# Patient Record
Sex: FEMALE | Race: WHITE | Employment: UNEMPLOYED | URBAN - METROPOLITAN AREA
[De-identification: names, ages, dates, MRNs, and addresses within clinical notes are randomized per-mention and may not be internally consistent; named-entity substitution may affect disease eponyms.]

---

## 2017-11-08 ENCOUNTER — OFFICE VISIT (OUTPATIENT)
Dept: GASTROENTEROLOGY | Facility: CLINIC | Age: 32
End: 2017-11-08

## 2017-11-08 ENCOUNTER — LAB ENCOUNTER (OUTPATIENT)
Dept: LAB | Facility: HOSPITAL | Age: 32
End: 2017-11-08
Attending: INTERNAL MEDICINE
Payer: COMMERCIAL

## 2017-11-08 VITALS
WEIGHT: 144.63 LBS | SYSTOLIC BLOOD PRESSURE: 109 MMHG | HEART RATE: 60 BPM | HEIGHT: 63.5 IN | BODY MASS INDEX: 25.31 KG/M2 | DIASTOLIC BLOOD PRESSURE: 78 MMHG

## 2017-11-08 DIAGNOSIS — K59.00 CONSTIPATION, UNSPECIFIED CONSTIPATION TYPE: ICD-10-CM

## 2017-11-08 DIAGNOSIS — R11.2 NON-INTRACTABLE VOMITING WITH NAUSEA, UNSPECIFIED VOMITING TYPE: Primary | ICD-10-CM

## 2017-11-08 DIAGNOSIS — R11.2 NON-INTRACTABLE VOMITING WITH NAUSEA, UNSPECIFIED VOMITING TYPE: ICD-10-CM

## 2017-11-08 PROCEDURE — 99204 OFFICE O/P NEW MOD 45 MIN: CPT | Performed by: INTERNAL MEDICINE

## 2017-11-08 PROCEDURE — 99212 OFFICE O/P EST SF 10 MIN: CPT | Performed by: INTERNAL MEDICINE

## 2017-11-08 PROCEDURE — 80053 COMPREHEN METABOLIC PANEL: CPT

## 2017-11-08 PROCEDURE — 84443 ASSAY THYROID STIM HORMONE: CPT

## 2017-11-08 PROCEDURE — 86255 FLUORESCENT ANTIBODY SCREEN: CPT

## 2017-11-08 PROCEDURE — 85025 COMPLETE CBC W/AUTO DIFF WBC: CPT

## 2017-11-08 PROCEDURE — 83516 IMMUNOASSAY NONANTIBODY: CPT

## 2017-11-08 PROCEDURE — 36415 COLL VENOUS BLD VENIPUNCTURE: CPT

## 2017-11-08 RX ORDER — NORETHINDRONE 0.35 MG
KIT ORAL
Refills: 5 | COMMUNITY
Start: 2017-10-13 | End: 2019-02-28 | Stop reason: ALTCHOICE

## 2017-11-08 RX ORDER — SUCRALFATE ORAL 1 G/10ML
1 SUSPENSION ORAL ONCE AS NEEDED
Qty: 1 BOTTLE | Refills: 1 | Status: SHIPPED | OUTPATIENT
Start: 2017-11-08 | End: 2017-11-08

## 2017-11-08 RX ORDER — FAMOTIDINE 40 MG/1
40 TABLET, FILM COATED ORAL DAILY
Qty: 30 TABLET | Refills: 1 | Status: SHIPPED | OUTPATIENT
Start: 2017-11-08 | End: 2017-11-08

## 2017-11-08 NOTE — H&P
[facility], [facility] - Gastroenterology                                                                                                  Clinic History and Physical Medications (Active prior to today's visit):    Current Outpatient Prescriptions:  Prenatal Multivit-Min-Fe-FA (PRENATAL VITAMINS) 0.8 MG Oral Tab Take 1 tablet by mouth.  Disp:  Rfl:    SHAROBEL 0.35 MG Oral Tab  Disp:  Rfl: 5     Allergies:  No Known Stim Hormone)      Tissue Transglutaminase Ab, IgA      Endomysial Antibody IGA [E]    Meds This Visit:    Signed Prescriptions Disp Refills    sucralfate (CARAFATE) 1 GM/10ML Oral Suspension 1 Bottle 1      Sig: Take 10 mL (1 g total) by mouth once as nee

## 2017-11-08 NOTE — PATIENT INSTRUCTIONS
1. Get your blood tests completed    2. Get your X ray completed    3. Start carafate/sucralfate by mouth once daily before evening meals    4.  Start miralax     Miralax  Start taking miralax (or generic equivalent) once a day, which you can buy over the c

## 2018-02-06 ENCOUNTER — LAB ENCOUNTER (OUTPATIENT)
Dept: LAB | Facility: HOSPITAL | Age: 33
End: 2018-02-06
Attending: DERMATOLOGY
Payer: COMMERCIAL

## 2018-02-06 DIAGNOSIS — L30.9 ACUTE DERMATITIS: Primary | ICD-10-CM

## 2018-02-06 LAB
ALBUMIN SERPL BCP-MCNC: 4.5 G/DL (ref 3.5–4.8)
ALBUMIN/GLOB SERPL: 1.8 {RATIO} (ref 1–2)
ALP SERPL-CCNC: 30 U/L (ref 32–100)
ALT SERPL-CCNC: 14 U/L (ref 14–54)
ANION GAP SERPL CALC-SCNC: 11 MMOL/L (ref 0–18)
AST SERPL-CCNC: 19 U/L (ref 15–41)
BACTERIA UR QL AUTO: NEGATIVE /HPF
BASOPHILS # BLD: 0 K/UL (ref 0–0.2)
BASOPHILS NFR BLD: 1 %
BILIRUB SERPL-MCNC: 1 MG/DL (ref 0.3–1.2)
BUN SERPL-MCNC: 10 MG/DL (ref 8–20)
BUN/CREAT SERPL: 14.3 (ref 10–20)
CALCIUM SERPL-MCNC: 9.5 MG/DL (ref 8.5–10.5)
CHLORIDE SERPL-SCNC: 101 MMOL/L (ref 95–110)
CO2 SERPL-SCNC: 26 MMOL/L (ref 22–32)
CREAT SERPL-MCNC: 0.7 MG/DL (ref 0.5–1.5)
EOSINOPHIL # BLD: 0.4 K/UL (ref 0–0.7)
EOSINOPHIL NFR BLD: 6 %
ERYTHROCYTE [DISTWIDTH] IN BLOOD BY AUTOMATED COUNT: 12.7 % (ref 11–15)
GLOBULIN PLAS-MCNC: 2.5 G/DL (ref 2.5–3.7)
GLUCOSE SERPL-MCNC: 90 MG/DL (ref 70–99)
HCT VFR BLD AUTO: 41.4 % (ref 35–48)
HGB BLD-MCNC: 13.5 G/DL (ref 12–16)
LYMPHOCYTES # BLD: 2.8 K/UL (ref 1–4)
LYMPHOCYTES NFR BLD: 40 %
MCH RBC QN AUTO: 30.3 PG (ref 27–32)
MCHC RBC AUTO-ENTMCNC: 32.7 G/DL (ref 32–37)
MCV RBC AUTO: 92.7 FL (ref 80–100)
MONOCYTES # BLD: 0.5 K/UL (ref 0–1)
MONOCYTES NFR BLD: 8 %
NEUTROPHILS # BLD AUTO: 3.1 K/UL (ref 1.8–7.7)
NEUTROPHILS NFR BLD: 46 %
OSMOLALITY UR CALC.SUM OF ELEC: 285 MOSM/KG (ref 275–295)
PATIENT FASTING: NO
PLATELET # BLD AUTO: 276 K/UL (ref 140–400)
PMV BLD AUTO: 8.7 FL (ref 7.4–10.3)
POTASSIUM SERPL-SCNC: 3.8 MMOL/L (ref 3.3–5.1)
PROT SERPL-MCNC: 7 G/DL (ref 5.9–8.4)
RBC # BLD AUTO: 4.47 M/UL (ref 3.7–5.4)
RBC #/AREA URNS AUTO: 1 /HPF
SODIUM SERPL-SCNC: 138 MMOL/L (ref 136–144)
WBC # BLD AUTO: 6.9 K/UL (ref 4–11)
WBC #/AREA URNS AUTO: <1 /HPF

## 2018-02-06 PROCEDURE — 36415 COLL VENOUS BLD VENIPUNCTURE: CPT

## 2018-02-06 PROCEDURE — 85025 COMPLETE CBC W/AUTO DIFF WBC: CPT

## 2018-02-06 PROCEDURE — 86235 NUCLEAR ANTIGEN ANTIBODY: CPT

## 2018-02-06 PROCEDURE — 81015 MICROSCOPIC EXAM OF URINE: CPT

## 2018-02-06 PROCEDURE — 80053 COMPREHEN METABOLIC PANEL: CPT

## 2018-02-06 PROCEDURE — 86038 ANTINUCLEAR ANTIBODIES: CPT

## 2018-02-08 LAB — NUCLEAR IGG TITR SER IF: NEGATIVE {TITER}

## 2018-02-13 LAB
ENA SS-A AB SER QL IA: NEGATIVE
ENA SS-B AB SER QL IA: NEGATIVE

## 2018-03-29 ENCOUNTER — OFFICE VISIT (OUTPATIENT)
Dept: FAMILY MEDICINE CLINIC | Facility: CLINIC | Age: 33
End: 2018-03-29

## 2018-03-29 VITALS
HEART RATE: 84 BPM | OXYGEN SATURATION: 98 % | SYSTOLIC BLOOD PRESSURE: 112 MMHG | RESPIRATION RATE: 14 BRPM | BODY MASS INDEX: 25.2 KG/M2 | HEIGHT: 63.5 IN | WEIGHT: 144 LBS | DIASTOLIC BLOOD PRESSURE: 76 MMHG | TEMPERATURE: 99 F

## 2018-03-29 DIAGNOSIS — J02.9 SORE THROAT: Primary | ICD-10-CM

## 2018-03-29 LAB — CONTROL LINE PRESENT WITH A CLEAR BACKGROUND (YES/NO): YES YES/NO

## 2018-03-29 PROCEDURE — 87880 STREP A ASSAY W/OPTIC: CPT | Performed by: NURSE PRACTITIONER

## 2018-03-29 PROCEDURE — 99202 OFFICE O/P NEW SF 15 MIN: CPT | Performed by: NURSE PRACTITIONER

## 2018-03-29 NOTE — PATIENT INSTRUCTIONS
· Hydrate! (cold or hot based on comfort). Drink lots of water or other non dehydrating liquids to help with illness. Salty foods, soups and tea can help with throat pain.    · Hand washing-use hand  or wash hands frequently, cover your cough Over-the-counter medicine can reduce sore throat symptoms. Ask your pharmacist if you have questions about which medicine to use:  · Ease pain with anesthetic sprays. Aspirin or an aspirin substitute also helps.  Remember, never give aspirin to anyone 18 or You have a viral upper respiratory illness (URI), which is another term for the common cold. This illness is contagious during the first few days. It is spread through the air by coughing and sneezing.  It may also be spread by direct contact (touching the · Cough with lots of colored sputum (mucus)  · Severe headache; face, neck, or ear pain  · Difficulty swallowing due to throat pain  · Fever of 100.4°F (38°C) or higher, or as directed by your healthcare provider  Call 911  Call 911 if any of these occur:

## 2018-03-29 NOTE — PROGRESS NOTES
CHIEF COMPLAINT:   Patient presents with:  Nasal Congestion      HPI:   Mariano Rodríguez is a 29year old female who presents for uri symptoms for  1 days. Patient reports sore throat, congestion. Symptoms have been mild since onset.   Treating symptoms with keanu HEAD: atraumatic, normocephalic.  no tenderness on palpation of sinuses  EYES: conjunctiva clear, EOM intact  EARS: TM's clear, no bulging, no retraction, no fluid, bony landmarks present  NOSE: Nostrils patent, clear nasal discharge, nasal mucosa erythema · May use Tylenol or Ibuprofen or other over the counter medication for discomfort, if not contraindicated. · Cepacol lozenges or Chloroseptic throat spray (active ingredient Benzocaine) may help soothe throat during the day.   · Follow up in a few days or · Stop smoking or reduce contact with secondhand smoke. Smoke irritates the tender throat lining. · Limit contact with pets and with allergy-causing substances, such as pollen and mold.   · When you’re around someone with a sore throat or cold, wash your h · You may use acetaminophen or ibuprofen to control pain and fever, unless another medicine was prescribed. If you have chronic liver or kidney disease, have ever had a stomach ulcer or gastrointestinal bleeding, or are taking blood-thinning medicines, roosevelt

## 2019-01-17 ENCOUNTER — HOSPITAL ENCOUNTER (EMERGENCY)
Facility: HOSPITAL | Age: 34
Discharge: HOME | End: 2019-01-17
Payer: COMMERCIAL

## 2019-01-17 ENCOUNTER — APPOINTMENT (OUTPATIENT)
Dept: ULTRASOUND IMAGING | Facility: HOSPITAL | Age: 34
End: 2019-01-17
Attending: NURSE PRACTITIONER
Payer: COMMERCIAL

## 2019-01-17 VITALS
TEMPERATURE: 99 F | OXYGEN SATURATION: 100 % | BODY MASS INDEX: 25.44 KG/M2 | DIASTOLIC BLOOD PRESSURE: 81 MMHG | HEART RATE: 75 BPM | HEIGHT: 64 IN | WEIGHT: 149 LBS | RESPIRATION RATE: 17 BRPM | SYSTOLIC BLOOD PRESSURE: 105 MMHG

## 2019-01-17 DIAGNOSIS — O20.0 MISCARRIAGE, THREATENED, EARLY PREGNANCY: Primary | ICD-10-CM

## 2019-01-17 LAB
ANION GAP SERPL CALC-SCNC: 8 MMOL/L (ref 0–18)
B-HCG SERPL-ACNC: NORMAL MIU/ML
BACTERIA UR QL AUTO: NEGATIVE /HPF
BASOPHILS # BLD: 0 K/UL (ref 0–0.2)
BASOPHILS NFR BLD: 1 %
BILIRUB UR QL: NEGATIVE
BUN SERPL-MCNC: 12 MG/DL (ref 8–20)
BUN/CREAT SERPL: 18.2 (ref 10–20)
CALCIUM SERPL-MCNC: 9 MG/DL (ref 8.5–10.5)
CHLORIDE SERPL-SCNC: 103 MMOL/L (ref 95–110)
CLARITY UR: CLEAR
CO2 SERPL-SCNC: 24 MMOL/L (ref 22–32)
COLOR UR: YELLOW
CREAT SERPL-MCNC: 0.66 MG/DL (ref 0.5–1.5)
EOSINOPHIL # BLD: 0.2 K/UL (ref 0–0.7)
EOSINOPHIL NFR BLD: 3 %
ERYTHROCYTE [DISTWIDTH] IN BLOOD BY AUTOMATED COUNT: 12.3 % (ref 11–15)
GLUCOSE SERPL-MCNC: 98 MG/DL (ref 70–99)
GLUCOSE UR-MCNC: NEGATIVE MG/DL
HCT VFR BLD AUTO: 38 % (ref 35–48)
HGB BLD-MCNC: 12.9 G/DL (ref 12–16)
HYALINE CASTS #/AREA URNS AUTO: 1 /LPF
KETONES UR-MCNC: NEGATIVE MG/DL
LEUKOCYTE ESTERASE UR QL STRIP.AUTO: NEGATIVE
LYMPHOCYTES # BLD: 2.7 K/UL (ref 1–4)
LYMPHOCYTES NFR BLD: 43 %
MCH RBC QN AUTO: 31.1 PG (ref 27–32)
MCHC RBC AUTO-ENTMCNC: 34 G/DL (ref 32–37)
MCV RBC AUTO: 91.7 FL (ref 80–100)
MONOCYTES # BLD: 0.5 K/UL (ref 0–1)
MONOCYTES NFR BLD: 7 %
NEUTROPHILS # BLD AUTO: 2.9 K/UL (ref 1.8–7.7)
NEUTROPHILS NFR BLD: 46 %
NITRITE UR QL STRIP.AUTO: NEGATIVE
OSMOLALITY UR CALC.SUM OF ELEC: 280 MOSM/KG (ref 275–295)
PH UR: 6 [PH] (ref 5–8)
PLATELET # BLD AUTO: 259 K/UL (ref 140–400)
PMV BLD AUTO: 8.4 FL (ref 7.4–10.3)
POTASSIUM SERPL-SCNC: 3.5 MMOL/L (ref 3.3–5.1)
PROT UR-MCNC: NEGATIVE MG/DL
RBC # BLD AUTO: 4.14 M/UL (ref 3.7–5.4)
RBC #/AREA URNS AUTO: <1 /HPF
RH BLOOD TYPE: POSITIVE
SODIUM SERPL-SCNC: 135 MMOL/L (ref 136–144)
SP GR UR STRIP: 1.01 (ref 1–1.03)
UROBILINOGEN UR STRIP-ACNC: <2
VIT C UR-MCNC: NEGATIVE MG/DL
WBC # BLD AUTO: 6.3 K/UL (ref 4–11)
WBC #/AREA URNS AUTO: <1 /HPF

## 2019-01-17 PROCEDURE — 76801 OB US < 14 WKS SINGLE FETUS: CPT | Performed by: NURSE PRACTITIONER

## 2019-01-17 PROCEDURE — 84702 CHORIONIC GONADOTROPIN TEST: CPT | Performed by: NURSE PRACTITIONER

## 2019-01-17 PROCEDURE — 86900 BLOOD TYPING SEROLOGIC ABO: CPT | Performed by: NURSE PRACTITIONER

## 2019-01-17 PROCEDURE — 80048 BASIC METABOLIC PNL TOTAL CA: CPT | Performed by: NURSE PRACTITIONER

## 2019-01-17 PROCEDURE — 81001 URINALYSIS AUTO W/SCOPE: CPT | Performed by: NURSE PRACTITIONER

## 2019-01-17 PROCEDURE — 99284 EMERGENCY DEPT VISIT MOD MDM: CPT

## 2019-01-17 PROCEDURE — 86901 BLOOD TYPING SEROLOGIC RH(D): CPT | Performed by: NURSE PRACTITIONER

## 2019-01-17 PROCEDURE — 85025 COMPLETE CBC W/AUTO DIFF WBC: CPT | Performed by: NURSE PRACTITIONER

## 2019-01-17 PROCEDURE — 76817 TRANSVAGINAL US OBSTETRIC: CPT | Performed by: NURSE PRACTITIONER

## 2019-01-17 PROCEDURE — 36415 COLL VENOUS BLD VENIPUNCTURE: CPT

## 2019-01-17 NOTE — ED NOTES
Pt to the ed today with bilateral upper abd cramping that she states has been going on throughout her pregnancy but she states that she began having some vaginal spotting last night that stopped but began again 2hrs pta.  Pt states that she is 7 weeks pregn

## 2019-01-17 NOTE — ED INITIAL ASSESSMENT (HPI)
Pt reports spotting that began 2 hours ago and states that she is 7 weeks pregnant. Pt denies sexual intercourse today.  . Pt reports cramping but states she has had the cramping for most of the pregnancy
Labor

## 2019-01-17 NOTE — ED PROVIDER NOTES
Patient Seen in: [facility] AND [facility] Emergency Department    History   Patient presents with:  Pregnancy Issues (gynecologic)    Stated Complaint: Vaginal Bleeding (7 weeks prego)     HPI    33yr old  here with complaints of bilateral upper  abdominal clear to auscultation  Cardiovascular: regular rate and rhythm    Gastrointestinal:   Neurological: II-XII grossly intact  no focal deficits  Skin: warm and dry, no rashes.   Musculoskeletal: neck is supple non tender        Extremities are symmetrical, ful Impression:  Miscarriage, threatened, early pregnancy  (primary encounter diagnosis)    Disposition:  Discharge    Follow-up:  Ema Nix, [address] [facility] [phone]            Medications Prescribed:  Discharge

## 2019-02-28 ENCOUNTER — OFFICE VISIT (OUTPATIENT)
Dept: FAMILY MEDICINE CLINIC | Facility: CLINIC | Age: 34
End: 2019-02-28
Payer: COMMERCIAL

## 2019-02-28 VITALS
SYSTOLIC BLOOD PRESSURE: 124 MMHG | DIASTOLIC BLOOD PRESSURE: 78 MMHG | TEMPERATURE: 98 F | BODY MASS INDEX: 26.12 KG/M2 | HEIGHT: 64 IN | RESPIRATION RATE: 12 BRPM | WEIGHT: 153 LBS | OXYGEN SATURATION: 100 % | HEART RATE: 82 BPM

## 2019-02-28 DIAGNOSIS — J01.20 ACUTE NON-RECURRENT ETHMOIDAL SINUSITIS: Primary | ICD-10-CM

## 2019-02-28 PROCEDURE — 99213 OFFICE O/P EST LOW 20 MIN: CPT | Performed by: NURSE PRACTITIONER

## 2019-02-28 RX ORDER — IBUPROFEN 600 MG/1
600 TABLET ORAL
COMMUNITY
Start: 2019-01-28 | End: 2019-02-28 | Stop reason: ALTCHOICE

## 2019-02-28 RX ORDER — MISOPROSTOL 200 UG/1
TABLET ORAL
Refills: 0 | COMMUNITY
Start: 2019-01-21 | End: 2019-06-26 | Stop reason: ALTCHOICE

## 2019-02-28 RX ORDER — AMOXICILLIN AND CLAVULANATE POTASSIUM 875; 125 MG/1; MG/1
1 TABLET, FILM COATED ORAL 2 TIMES DAILY
Qty: 14 TABLET | Refills: 0 | Status: SHIPPED | OUTPATIENT
Start: 2019-02-28 | End: 2019-03-07

## 2019-03-01 NOTE — PROGRESS NOTES
CHIEF COMPLAINT:   Patient presents with:  Sinus Problem      HPI:   Padmaja Mcleod is a 34year old female who presents for cold symptoms for  1.5  weeks. Symptoms have progressed into sinus congestion and been worsening since onset.  Sinus congestion/pain is d GENERAL: well developed, well nourished,in no apparent distress  SKIN: no rashes,no suspicious lesions  HEAD: atraumatic, normocephalic, +  tenderness on palpation of ethmoid and maxillary sinuses. Worse in ethmoid.   EYES: conjunctiva clear, EOM intact  E · Hydrate! (cold or hot based on comfort). Drink lots of water or other non dehydrating liquids to help with illness. Salty foods, soups and tea can help with throat pain.    · Hand washing-use hand  or wash hands frequently, cover your cough or sn Drinking extra fluids helps thin your mucus. This lets it drain from your sinuses more easily. Have a glass of water every hour or two. A humidifier helps in much the same way. Fluids can also offset the drying effects of certain medicines.  If you use a hu The sinuses are air-filled spaces within the bones of the face. They connect to the inside of the nose. Sinusitis is an inflammation of the tissue that lines the sinuses. Sinusitis can occur during a cold.  It can also happen due to allergies to pollens and · Do not use nasal rinses or irrigation during an acute sinus infection, unless your healthcare provider tells you to. Rinsing may spread the infection to other areas in your sinuses.   · Use acetaminophen or ibuprofen to control pain, unless another pain m

## 2019-03-01 NOTE — PATIENT INSTRUCTIONS
· Take antibiotics as directed. Finish all the medication even if you feel better. · Probiotics or yogurt daily during antibiotic use will help decrease stomach upset and restore good bacteria to the gut. Separate times by at least 2-4 hours.     Comfor Sinusitis can often be managed with self-care. Self-care can keep sinuses moist and make you feel more comfortable. Remember to follow your doctor's instructions closely. This can make a big difference in getting your sinus problem under control.   Drink fl The sinuses are air-filled spaces within the bones of the face. They connect to the inside of the nose. Sinusitis is an inflammation of the tissue that lines the sinuses. Sinusitis can occur during a cold.  It can also happen due to allergies to pollens and · Do not use nasal rinses or irrigation during an acute sinus infection, unless your healthcare provider tells you to. Rinsing may spread the infection to other areas in your sinuses.   · Use acetaminophen or ibuprofen to control pain, unless another pain m

## 2019-06-26 ENCOUNTER — OFFICE VISIT (OUTPATIENT)
Dept: FAMILY MEDICINE CLINIC | Facility: CLINIC | Age: 34
End: 2019-06-26
Payer: COMMERCIAL

## 2019-06-26 VITALS
BODY MASS INDEX: 26.94 KG/M2 | HEART RATE: 85 BPM | DIASTOLIC BLOOD PRESSURE: 76 MMHG | SYSTOLIC BLOOD PRESSURE: 112 MMHG | WEIGHT: 157.81 LBS | HEIGHT: 64 IN

## 2019-06-26 DIAGNOSIS — Z00.00 ROUTINE MEDICAL EXAM: Primary | ICD-10-CM

## 2019-06-26 DIAGNOSIS — M25.50 POLYARTHRALGIA: ICD-10-CM

## 2019-06-26 DIAGNOSIS — R63.5 WEIGHT GAIN: ICD-10-CM

## 2019-06-26 DIAGNOSIS — R14.0 BLOATING: ICD-10-CM

## 2019-06-26 DIAGNOSIS — K64.9 HEMORRHOIDS, UNSPECIFIED HEMORRHOID TYPE: ICD-10-CM

## 2019-06-26 DIAGNOSIS — L68.0 HIRSUTISM: ICD-10-CM

## 2019-06-26 PROCEDURE — 99213 OFFICE O/P EST LOW 20 MIN: CPT | Performed by: FAMILY MEDICINE

## 2019-06-26 PROCEDURE — 99385 PREV VISIT NEW AGE 18-39: CPT | Performed by: FAMILY MEDICINE

## 2019-06-26 PROCEDURE — 99212 OFFICE O/P EST SF 10 MIN: CPT | Performed by: FAMILY MEDICINE

## 2019-06-26 RX ORDER — SPIRONOLACTONE 100 MG/1
150 TABLET, FILM COATED ORAL DAILY
Refills: 0 | COMMUNITY
Start: 2019-06-18 | End: 2020-07-01

## 2019-06-26 NOTE — PROGRESS NOTES
HPI:   Robert Hoffmann is a 34year old female who presents for a complete physical exam.    Pt here for first time with me. Reports exercising regularly and eating super clean. Feels like not losing any weight.  On spironolactone   Reports irregular menses and denies hx of allergy or asthma    EXAM:   /76 (BP Location: Left arm)   Pulse 85   Ht 5' 4\" (1.626 m)   Wt 157 lb 12.8 oz (71.6 kg)   BMI 27.09 kg/m²     GENERAL: well developed, well nourished,in no apparent distress  SKIN: no rashes,no suspicious

## 2019-06-27 ENCOUNTER — LAB ENCOUNTER (OUTPATIENT)
Dept: LAB | Facility: HOSPITAL | Age: 34
End: 2019-06-27
Attending: DERMATOLOGY
Payer: COMMERCIAL

## 2019-06-27 DIAGNOSIS — R14.0 BLOATING: ICD-10-CM

## 2019-06-27 DIAGNOSIS — L70.0 NODULAR ELASTOSIS WITH CYSTS AND COMEDONES OF FAVRE AND RACOUCHOT: Primary | ICD-10-CM

## 2019-06-27 DIAGNOSIS — Z00.00 ROUTINE MEDICAL EXAM: ICD-10-CM

## 2019-06-27 DIAGNOSIS — L57.8 NODULAR ELASTOSIS WITH CYSTS AND COMEDONES OF FAVRE AND RACOUCHOT: Primary | ICD-10-CM

## 2019-06-27 PROCEDURE — 83525 ASSAY OF INSULIN: CPT

## 2019-06-27 PROCEDURE — 85025 COMPLETE CBC W/AUTO DIFF WBC: CPT

## 2019-06-27 PROCEDURE — 36415 COLL VENOUS BLD VENIPUNCTURE: CPT

## 2019-06-27 PROCEDURE — 84443 ASSAY THYROID STIM HORMONE: CPT

## 2019-06-27 PROCEDURE — 84439 ASSAY OF FREE THYROXINE: CPT

## 2019-06-27 PROCEDURE — 83013 H PYLORI (C-13) BREATH: CPT

## 2019-06-27 PROCEDURE — 84402 ASSAY OF FREE TESTOSTERONE: CPT

## 2019-06-27 PROCEDURE — 84403 ASSAY OF TOTAL TESTOSTERONE: CPT

## 2019-06-27 PROCEDURE — 82607 VITAMIN B-12: CPT

## 2019-06-27 PROCEDURE — 80053 COMPREHEN METABOLIC PANEL: CPT

## 2019-06-27 PROCEDURE — 82306 VITAMIN D 25 HYDROXY: CPT

## 2019-06-27 PROCEDURE — 80061 LIPID PANEL: CPT

## 2019-07-02 ENCOUNTER — OFFICE VISIT (OUTPATIENT)
Dept: SURGERY | Facility: CLINIC | Age: 34
End: 2019-07-02
Payer: COMMERCIAL

## 2019-07-02 VITALS — WEIGHT: 155 LBS | BODY MASS INDEX: 26.46 KG/M2 | HEIGHT: 64 IN

## 2019-07-02 DIAGNOSIS — K64.2 GRADE III HEMORRHOIDS: Primary | ICD-10-CM

## 2019-07-02 DIAGNOSIS — K64.4 RESIDUAL HEMORRHOIDAL SKIN TAGS: ICD-10-CM

## 2019-07-02 PROCEDURE — 99204 OFFICE O/P NEW MOD 45 MIN: CPT | Performed by: SURGERY

## 2019-07-02 PROCEDURE — 46600 DIAGNOSTIC ANOSCOPY SPX: CPT | Performed by: SURGERY

## 2019-07-02 RX ORDER — BIOTIN 2500 MCG
CAPSULE ORAL
COMMUNITY
End: 2020-07-01

## 2019-07-02 NOTE — PROCEDURES
Procedure Report    Patient Name:  Salena Osborne  MR:  [id number]  :  1985  DOS:  19    Preop Dx:   Grade III hemorrhoids  (primary encounter diagnosis)   SNOMED CT(R): INTERNAL HEMORRHOIDS GRADE III     Residual hemorrhoidal skin tags   SNOMED C

## 2019-07-02 NOTE — PROGRESS NOTES
Evangelina Dillard - Your labs were all normal. Your thyroid levels, hormone levels, insulin were normal. Normal glucose, kidney and liver function. Total cholesterol normal - high HDL is good - that is our good cholesterol.  I know you are frustrated with lack of we

## 2019-07-02 NOTE — H&P
HPI:    Patient ID: Kirit Law is a 34year old female presenting with Patient presents with:  Hemorrhoids: Pt referred by Dr. Cindy Acuña regarding hemorrhoids. Pt c/o moist & itchy irritation to area. Pt also suffers from constipation.   Pt states she feels lik Forced sexual activity: Not on file    Other Topics      Concerns:        Not on file    Social History Narrative      Not on file        Constitutional: Negative. HENT: Negative. Eyes: Negative. Respiratory: Negative. Cardiovascular: Negative. recommend sitz baths and topical hydrocortisone cream and tucks pads. Intermittent stool softener therapy and laxatives may be of benefit. Avoid NSAIDs and blood thinners as able. The patient was also instructed to limit time sitting on the commode.  We d

## 2019-07-08 ENCOUNTER — OFFICE VISIT (OUTPATIENT)
Dept: RHEUMATOLOGY | Facility: CLINIC | Age: 34
End: 2019-07-08
Payer: COMMERCIAL

## 2019-07-08 ENCOUNTER — HOSPITAL ENCOUNTER (OUTPATIENT)
Dept: GENERAL RADIOLOGY | Age: 34
Discharge: HOME | End: 2019-07-08
Attending: INTERNAL MEDICINE
Payer: COMMERCIAL

## 2019-07-08 ENCOUNTER — APPOINTMENT (OUTPATIENT)
Dept: LAB | Age: 34
End: 2019-07-08
Attending: INTERNAL MEDICINE
Payer: COMMERCIAL

## 2019-07-08 ENCOUNTER — OFFICE VISIT (OUTPATIENT)
Dept: NEUROLOGY | Facility: CLINIC | Age: 34
End: 2019-07-08
Payer: COMMERCIAL

## 2019-07-08 VITALS
HEART RATE: 76 BPM | SYSTOLIC BLOOD PRESSURE: 110 MMHG | HEIGHT: 64 IN | DIASTOLIC BLOOD PRESSURE: 70 MMHG | WEIGHT: 157 LBS | BODY MASS INDEX: 26.8 KG/M2

## 2019-07-08 DIAGNOSIS — G89.29 WRIST PAIN, CHRONIC, RIGHT: Primary | ICD-10-CM

## 2019-07-08 DIAGNOSIS — M54.12 CERVICAL RADICULOPATHY: ICD-10-CM

## 2019-07-08 DIAGNOSIS — M77.8 RIGHT WRIST TENDONITIS: Primary | ICD-10-CM

## 2019-07-08 DIAGNOSIS — M54.2 NECK PAIN: ICD-10-CM

## 2019-07-08 DIAGNOSIS — M25.531 WRIST PAIN, CHRONIC, RIGHT: ICD-10-CM

## 2019-07-08 DIAGNOSIS — M25.50 POLYARTHRALGIA: ICD-10-CM

## 2019-07-08 DIAGNOSIS — M25.531 WRIST PAIN, CHRONIC, RIGHT: Primary | ICD-10-CM

## 2019-07-08 DIAGNOSIS — G89.29 WRIST PAIN, CHRONIC, RIGHT: ICD-10-CM

## 2019-07-08 DIAGNOSIS — G56.03 BILATERAL CARPAL TUNNEL SYNDROME: ICD-10-CM

## 2019-07-08 LAB
CRP SERPL-MCNC: <0.29 MG/DL (ref ?–0.3)
ERYTHROCYTE [SEDIMENTATION RATE] IN BLOOD: 5 MM/HR (ref 0–20)
RHEUMATOID FACT SERPL-ACNC: <10 IU/ML (ref ?–15)

## 2019-07-08 PROCEDURE — 99244 OFF/OP CNSLTJ NEW/EST MOD 40: CPT | Performed by: INTERNAL MEDICINE

## 2019-07-08 PROCEDURE — 85652 RBC SED RATE AUTOMATED: CPT | Performed by: INTERNAL MEDICINE

## 2019-07-08 PROCEDURE — 36415 COLL VENOUS BLD VENIPUNCTURE: CPT | Performed by: INTERNAL MEDICINE

## 2019-07-08 PROCEDURE — 86200 CCP ANTIBODY: CPT | Performed by: INTERNAL MEDICINE

## 2019-07-08 PROCEDURE — 86140 C-REACTIVE PROTEIN: CPT | Performed by: INTERNAL MEDICINE

## 2019-07-08 PROCEDURE — 86431 RHEUMATOID FACTOR QUANT: CPT | Performed by: INTERNAL MEDICINE

## 2019-07-08 PROCEDURE — 72052 X-RAY EXAM NECK SPINE 6/>VWS: CPT | Performed by: INTERNAL MEDICINE

## 2019-07-08 PROCEDURE — 73110 X-RAY EXAM OF WRIST: CPT | Performed by: INTERNAL MEDICINE

## 2019-07-08 NOTE — PROGRESS NOTES
Roby Buerger is a 34year old female who presents for R wrist pain. HPI:     I had the pleasure of seeing Roby Buerger on 7/8/2019 for evaluation of R wrist pain.   Patient was referred by PCP Dr. Gordon Lopez    This is a 35 yo F with history of GERD presents with No          REVIEW OF SYSTEMS:   Review Of Systems:  Constitutional: No fever, no change in weight or appetitie  Derm: No rashes, no oral ulcers, no alopecia, no photosensitivity, no psoriasis  HEENT: No dry eyes, no dry mouth, no Raynaud's, no nasal ulcer intact.   PSYCH: normal mood    LABS:     Component      Latest Ref Rng & Units 6/27/2019   WBC      4.0 - 11.0 x10(3) uL 7.9   RBC      3.80 - 5.30 x10(6)uL 4.23   Hemoglobin      12.0 - 16.0 g/dL 13.2   Hematocrit      35.0 - 48.0 % 39.6   MCV      80.0 - T4,Free (Direct)      0.8 - 1.7 ng/dL 0.8   TSH      0.358 - 3.740 mIU/mL 2.280   Vitamin D, 25OH, Total      30.0 - 100.0 ng/mL 35.8   Vitamin B12      193 - 986 pg/mL 377     Component      Latest Ref Rng & Units 2/6/2018   Anti-Sjogren's A      Negati

## 2019-07-08 NOTE — PATIENT INSTRUCTIONS
You were seen today for pain in your R wrist  - plan to get blood work and xrays  - if everything is negative plan on getting mri of neck  - does not appear to be autoimmune related but will work it up

## 2019-07-09 ENCOUNTER — OFFICE VISIT (OUTPATIENT)
Dept: ENDOCRINOLOGY CLINIC | Facility: CLINIC | Age: 34
End: 2019-07-09
Payer: COMMERCIAL

## 2019-07-09 VITALS
BODY MASS INDEX: 27 KG/M2 | WEIGHT: 157.38 LBS | DIASTOLIC BLOOD PRESSURE: 78 MMHG | SYSTOLIC BLOOD PRESSURE: 110 MMHG | HEART RATE: 76 BPM

## 2019-07-09 DIAGNOSIS — L70.8 OTHER ACNE: ICD-10-CM

## 2019-07-09 DIAGNOSIS — N92.1 MENORRHAGIA WITH IRREGULAR CYCLE: ICD-10-CM

## 2019-07-09 DIAGNOSIS — Z13.29 THYROID DISORDER SCREENING: Primary | ICD-10-CM

## 2019-07-09 DIAGNOSIS — R63.5 WEIGHT GAIN: ICD-10-CM

## 2019-07-09 DIAGNOSIS — L68.9 EXCESS BODY AND FACIAL HAIR: ICD-10-CM

## 2019-07-09 PROBLEM — N92.0 HEAVY MENSTRUAL BLEEDING: Status: ACTIVE | Noted: 2019-07-09

## 2019-07-09 PROBLEM — R53.83 FATIGUE: Status: ACTIVE | Noted: 2019-07-09

## 2019-07-09 PROCEDURE — 99203 OFFICE O/P NEW LOW 30 MIN: CPT | Performed by: INTERNAL MEDICINE

## 2019-07-09 NOTE — H&P
New Patient Evaluation - History and Physical    CONSULT - Reason for Visit: Evaluation of thyroid function, excessive hair growth, acne, heavy menstrual bleeding, fatigue Requesting Physician: Self referral    CHIEF COMPLAINT:  Patient presents with:  Con Occupation: stay at home mom    Tobacco Use      Smoking status: Never Smoker      Smokeless tobacco: Never Used    Substance and Sexual Activity      Alcohol use: Yes        Comment: occ      Drug use: No      FAMILY HISTORY:   Family History   Problem Re old female. She is here for evaluation of symptoms of fatigue, weigh gain, acne and would like to see if treatment with thyroid medication help her symptoms  Discussed thyroid gland structure and function  Discussed hashimotos thyroiditis in detail.   Disc

## 2019-07-09 NOTE — PROGRESS NOTES
Cervical Pain H & P    Chief Complaint:  No chief complaint on file. HPI:  Randy Deluca is a 34year old year old right handed female with a prior history of wrist and hand tingling that started with her first pregnancy about 5 years ago.   The tingling history.     Past Surgical History   Past Surgical History:   Procedure Laterality Date   • ANESTH, SECTION      x2       Family History   Family History   Problem Relation Age of Onset   • Hypertension Father    • Heart Attack Father 39   • Heart D weight gain. ENT:   Negative for hearing loss. Eyes:   Negative for vision changes. Respiratory:  Negative for dyspnea. Cardio:   Negative for chest pain. GI:   Negative for abdominal pain. The patient denies constipation and diarrhea.    :   The pat Navarro's sign Right: Negative   Navarro's sigh Left: Negative     C-Spine Special Tests  Special Tests: Chin tuck: Positive  Right Bakody's sign: Positive   Right Tinel’s sign at the distal radial nerve in the forearm is positive  Right radial nerve str

## 2019-07-10 ENCOUNTER — APPOINTMENT (OUTPATIENT)
Dept: LAB | Facility: HOSPITAL | Age: 34
End: 2019-07-10
Attending: INTERNAL MEDICINE
Payer: COMMERCIAL

## 2019-07-10 DIAGNOSIS — L68.9 EXCESS BODY AND FACIAL HAIR: ICD-10-CM

## 2019-07-10 DIAGNOSIS — L70.8 OTHER ACNE: ICD-10-CM

## 2019-07-10 DIAGNOSIS — R63.5 WEIGHT GAIN: ICD-10-CM

## 2019-07-10 DIAGNOSIS — Z13.29 THYROID DISORDER SCREENING: ICD-10-CM

## 2019-07-10 DIAGNOSIS — N92.1 MENORRHAGIA WITH IRREGULAR CYCLE: ICD-10-CM

## 2019-07-10 LAB
CCP IGG SERPL-ACNC: <0.4 U/ML (ref 0–6.9)
CORTIS SERPL-MCNC: 9.9 UG/DL
DHEA-S SERPL-MCNC: 99.8 UG/DL
PROLACTIN SERPL-MCNC: 5.7 NG/ML
TESTOST SERPL-MCNC: 12.83 NG/DL
THYROPEROXIDASE AB SERPL-ACNC: 31 U/ML (ref ?–60)

## 2019-07-10 PROCEDURE — 84403 ASSAY OF TOTAL TESTOSTERONE: CPT

## 2019-07-10 PROCEDURE — 82627 DEHYDROEPIANDROSTERONE: CPT

## 2019-07-10 PROCEDURE — 36415 COLL VENOUS BLD VENIPUNCTURE: CPT

## 2019-07-10 PROCEDURE — 86376 MICROSOMAL ANTIBODY EACH: CPT

## 2019-07-10 PROCEDURE — 82533 TOTAL CORTISOL: CPT

## 2019-07-10 PROCEDURE — 83498 ASY HYDROXYPROGESTERONE 17-D: CPT

## 2019-07-10 PROCEDURE — 84146 ASSAY OF PROLACTIN: CPT

## 2019-07-11 ENCOUNTER — TELEPHONE (OUTPATIENT)
Dept: NEUROLOGY | Facility: CLINIC | Age: 34
End: 2019-07-11

## 2019-07-11 NOTE — TELEPHONE ENCOUNTER
Karen for [facility] BS Online for authorization of approval for MRI C-spine wo cpt code 04552. Approval was given with Authorization # [id number] effective 07/11/19 to 08/25/19. Will call Pt. To inform. Pt. advised of approval. She will call to schedule appt.

## 2019-07-12 LAB — 17-HYDROPROGESTERONE QNT: 43.82 NG/DL

## 2019-07-15 ENCOUNTER — TELEPHONE (OUTPATIENT)
Dept: RHEUMATOLOGY | Facility: CLINIC | Age: 34
End: 2019-07-15

## 2019-07-15 NOTE — TELEPHONE ENCOUNTER
Pt returning call to Dr. Gamal Cavazos. \"Results comments\" note reviewed, pt verbalized understanding. She will proceed with MRI C-spine as ordered by Dr. Janeth Boone. Should pt cancel 7/22 f/u appt at this time or does she need to f/u after MRI? Please advise.

## 2019-07-27 ENCOUNTER — HOSPITAL ENCOUNTER (OUTPATIENT)
Dept: MRI IMAGING | Facility: HOSPITAL | Age: 34
Discharge: HOME | End: 2019-07-27
Attending: PHYSICAL MEDICINE & REHABILITATION
Payer: COMMERCIAL

## 2019-07-27 DIAGNOSIS — M54.12 CERVICAL RADICULOPATHY: ICD-10-CM

## 2019-07-27 PROCEDURE — 72141 MRI NECK SPINE W/O DYE: CPT | Performed by: PHYSICAL MEDICINE & REHABILITATION

## 2019-08-02 PROBLEM — M50.90 CERVICAL DISC DISEASE: Status: ACTIVE | Noted: 2019-08-02

## 2019-08-30 ENCOUNTER — NURSE TRIAGE (OUTPATIENT)
Dept: OTHER | Age: 34
End: 2019-08-30

## 2019-08-30 NOTE — TELEPHONE ENCOUNTER
Action Requested: Summary for Provider     []  Critical Lab, Recommendations Needed  [] Need Additional Advice  []   FYI    []   Need Orders  [] Need Medications Sent to Pharmacy  []  Other     SUMMARY: Pt seeking recommendations for irritation left eye.

## 2019-08-31 NOTE — TELEPHONE ENCOUNTER
It sounds like a stye-they usually resolved with the use of hot compresses-can get baby shampoo and put a few drops in a cup of warm water and use that to apply to affected eye for 10 min 4-6 times per day

## 2019-09-03 ENCOUNTER — PATIENT MESSAGE (OUTPATIENT)
Dept: OTHER | Age: 34
End: 2019-09-03

## 2019-10-02 ENCOUNTER — OFFICE VISIT (OUTPATIENT)
Dept: SURGERY | Facility: CLINIC | Age: 34
End: 2019-10-02
Payer: COMMERCIAL

## 2019-10-02 VITALS
DIASTOLIC BLOOD PRESSURE: 78 MMHG | SYSTOLIC BLOOD PRESSURE: 107 MMHG | OXYGEN SATURATION: 98 % | HEIGHT: 64 IN | WEIGHT: 155 LBS | HEART RATE: 77 BPM | BODY MASS INDEX: 26.46 KG/M2

## 2019-10-02 DIAGNOSIS — E66.3 OVERWEIGHT (BMI 25.0-29.9): ICD-10-CM

## 2019-10-02 DIAGNOSIS — R63.5 WEIGHT GAIN: ICD-10-CM

## 2019-10-02 DIAGNOSIS — R53.82 CHRONIC FATIGUE: Primary | ICD-10-CM

## 2019-10-02 PROCEDURE — 99204 OFFICE O/P NEW MOD 45 MIN: CPT | Performed by: INTERNAL MEDICINE

## 2019-10-02 RX ORDER — PRENATAL VIT/IRON FUM/FOLIC AC 27MG-0.8MG
1 TABLET ORAL
COMMUNITY
End: 2020-07-01

## 2019-10-02 RX ORDER — PHENTERMINE HYDROCHLORIDE 15 MG/1
15 CAPSULE ORAL EVERY MORNING
Qty: 30 CAPSULE | Refills: 2 | Status: SHIPPED | OUTPATIENT
Start: 2019-10-02 | End: 2019-10-22

## 2019-10-02 RX ORDER — GABAPENTIN 300 MG/1
CAPSULE ORAL
Refills: 0 | COMMUNITY
Start: 2019-07-15 | End: 2020-07-01

## 2019-10-02 NOTE — PROGRESS NOTES
[facility] Consultation Note       Patient:  Juan Alberto Hernandez  :      1985  MRN:      [id number]    Referring Provider: Dr. Oskar Obrien       Chief Complaint:  Patient presents with:  Consult  Weight Management      SUBJECTIVE     History o Worry: Not on file        Inability: Not on file      Transportation needs:        Medical: Not on file        Non-medical: Not on file    Tobacco Use      Smoking status: Never Smoker      Smokeless tobacco: Never Used    Substance and Sexual Activity hour of waking up and Eat 3-4 cups of fresh fruit or vegetables daily    Behavior Modifications Reviewed and Discussed:    Eat breakfast, Eat 3 meals per day, Plan meals in advance, Read nutrition labels, Drink 64oz of water per day, Maintain a daily food fruit juices or regular soda. 3. Increase activity-upper body exercises, walk 10 minutes per day. 4. Increase fruit and vegetable servings to 5-6 per day.       PHENTERMINE: Since the patient would like to try phentermine, and is aware of the potential si

## 2019-10-17 ENCOUNTER — PATIENT MESSAGE (OUTPATIENT)
Dept: GASTROENTEROLOGY | Facility: CLINIC | Age: 34
End: 2019-10-17

## 2019-10-17 NOTE — TELEPHONE ENCOUNTER
From: Randy Deluca  To:  Luca Reyna MD  Sent: 10/17/2019 8:01 AM CDT  Subject: Non-Urgent Medical Question    I saw dr Juju Hernández back for reflux and I mentioned in that appt some issues with constipation, I believe he called it functional cons

## 2019-10-22 ENCOUNTER — TELEPHONE (OUTPATIENT)
Dept: SURGERY | Facility: CLINIC | Age: 34
End: 2019-10-22

## 2019-10-22 RX ORDER — PHENTERMINE HYDROCHLORIDE 15 MG/1
CAPSULE ORAL
Qty: 60 CAPSULE | Refills: 0 | Status: CANCELLED | OUTPATIENT
Start: 2019-10-22

## 2019-10-22 NOTE — TELEPHONE ENCOUNTER
Pt left message on office vm stating that \"increase in phentermine is working\". As such, pt will run out of meds due to increased dose.  Requesting new Rx to be sent to pts preferred [facility].     New Rx of phentermine 15 mg   Sig: One tab po in AM, One ta

## 2019-11-05 RX ORDER — PHENTERMINE HYDROCHLORIDE 30 MG/1
30 CAPSULE ORAL EVERY MORNING
Qty: 30 CAPSULE | Refills: 1 | Status: SHIPPED | OUTPATIENT
Start: 2019-11-05 | End: 2019-12-06

## 2019-11-22 ENCOUNTER — OFFICE VISIT (OUTPATIENT)
Dept: SURGERY | Facility: CLINIC | Age: 34
End: 2019-11-22
Payer: COMMERCIAL

## 2019-11-22 ENCOUNTER — APPOINTMENT (OUTPATIENT)
Dept: LAB | Facility: HOSPITAL | Age: 34
End: 2019-11-22
Attending: INTERNAL MEDICINE
Payer: COMMERCIAL

## 2019-11-22 VITALS
HEART RATE: 88 BPM | HEIGHT: 64 IN | WEIGHT: 146.38 LBS | BODY MASS INDEX: 24.99 KG/M2 | SYSTOLIC BLOOD PRESSURE: 111 MMHG | OXYGEN SATURATION: 97 % | DIASTOLIC BLOOD PRESSURE: 77 MMHG

## 2019-11-22 DIAGNOSIS — Z51.81 ENCOUNTER FOR THERAPEUTIC DRUG MONITORING: ICD-10-CM

## 2019-11-22 DIAGNOSIS — R53.82 CHRONIC FATIGUE: ICD-10-CM

## 2019-11-22 DIAGNOSIS — K59.00 CONSTIPATION, UNSPECIFIED CONSTIPATION TYPE: ICD-10-CM

## 2019-11-22 DIAGNOSIS — E66.3 OVERWEIGHT (BMI 25.0-29.9): ICD-10-CM

## 2019-11-22 DIAGNOSIS — R63.5 WEIGHT GAIN: Primary | ICD-10-CM

## 2019-11-22 DIAGNOSIS — R63.5 WEIGHT GAIN: ICD-10-CM

## 2019-11-22 PROCEDURE — 93005 ELECTROCARDIOGRAM TRACING: CPT

## 2019-11-22 PROCEDURE — 99214 OFFICE O/P EST MOD 30 MIN: CPT | Performed by: INTERNAL MEDICINE

## 2019-11-22 PROCEDURE — 93010 ELECTROCARDIOGRAM REPORT: CPT | Performed by: INTERNAL MEDICINE

## 2019-11-22 NOTE — PROGRESS NOTES
[address] [place] [address]  Dept: [phone]       Patient:  Evon Vivar  :      1985  MRN:      [id number]    Chief Complaint:  Patient presents with Food insecurity:        Worry: Not on file        Inability: Not on file      Transportation needs:        Medical: Not on file        Non-medical: Not on file    Tobacco Use      Smoking status: Never Smoker      Smokeless tobacco: Never Used    Substance consumption per day:    · Amount of water (in ounces) per day:  64  · Drinking between meals only:  yes  · Toughest challenge:      Nutritional Goals  Limit carbohydrates to 100 gms per day, Eat 100-200 calories within 1 hour of waking  and Eat 3-4 cups of weight loss at this time. Constipation: will start Trulance  Will give samples  Should increase water intake  Fruit intake    Goals for next month:  1. Keep a food log. 2. Drink 48-64 ounces of non-caloric beverages per day.  No fruit juices or regula

## 2019-11-26 ENCOUNTER — TELEPHONE (OUTPATIENT)
Dept: GASTROENTEROLOGY | Facility: CLINIC | Age: 34
End: 2019-11-26

## 2019-11-26 ENCOUNTER — OFFICE VISIT (OUTPATIENT)
Dept: GASTROENTEROLOGY | Facility: CLINIC | Age: 34
End: 2019-11-26
Payer: COMMERCIAL

## 2019-11-26 VITALS
BODY MASS INDEX: 25.27 KG/M2 | HEART RATE: 88 BPM | WEIGHT: 148 LBS | HEIGHT: 64 IN | SYSTOLIC BLOOD PRESSURE: 118 MMHG | DIASTOLIC BLOOD PRESSURE: 81 MMHG

## 2019-11-26 DIAGNOSIS — K59.00 CONSTIPATION, UNSPECIFIED CONSTIPATION TYPE: ICD-10-CM

## 2019-11-26 DIAGNOSIS — K62.5 RECTAL BLEEDING: ICD-10-CM

## 2019-11-26 DIAGNOSIS — R14.0 ABDOMINAL BLOATING: Primary | ICD-10-CM

## 2019-11-26 PROCEDURE — 99214 OFFICE O/P EST MOD 30 MIN: CPT | Performed by: INTERNAL MEDICINE

## 2019-11-26 RX ORDER — PEG-3350, SODIUM SULFATE, SODIUM CHLORIDE, POTASSIUM CHLORIDE, SODIUM ASCORBATE AND ASCORBIC ACID 7.5-2.691G
1 KIT ORAL
Qty: 1 EACH | Refills: 0 | Status: SHIPPED | OUTPATIENT
Start: 2019-11-26 | End: 2019-11-27

## 2019-11-26 NOTE — PROGRESS NOTES
[facility], [facility] - Gastroenterology                                                                                                  Clinic Progress Note    Patient pr Relation Age of Onset   • Hypertension Father    • Heart Attack Father 39   • Heart Disorder Father    • Other (Other) Father         PMR   • Diabetes Mother    • Cancer Maternal Grandfather       Social History: Social History    Tobacco Use      Smoking multifactorial components to this. Discussed stopping miralax and starting linzess and may titrate up. Also increasing activia yogurt int he diet.  She's not had a bowel movement in a few days and discussed recommendation for mag citrate at this time and th

## 2019-11-26 NOTE — PATIENT INSTRUCTIONS
1. Schedule colonoscopy with monitored anesthesia care (MAC) at [facility]    2.  bowel prep from pharmacy (split Moviprep )    3. Continue all medications for procedure    4. Read all bowel prep instructions carefully    5.  AVOID see

## 2019-11-26 NOTE — TELEPHONE ENCOUNTER
Scheduled for:  Colonoscopy 59618  Provider Name: Dr. Lynsey Holcomb  Date:  12/3/19  Location:  [address] [place] [address]  Sedation:  MAC  Time:  1:00 pm, arrival 12:00 pm  Prep: Moviprep  Meds/Allergies Reconciled?:  Physician reviewed  Diagnosis with codes:  Abdominal bloating R14.0; C

## 2019-12-03 ENCOUNTER — ANESTHESIA EVENT (OUTPATIENT)
Dept: ENDOSCOPY | Age: 34
End: 2019-12-03
Payer: COMMERCIAL

## 2019-12-03 ENCOUNTER — ANESTHESIA (OUTPATIENT)
Dept: ENDOSCOPY | Age: 34
End: 2019-12-03
Payer: COMMERCIAL

## 2019-12-03 ENCOUNTER — HOSPITAL ENCOUNTER (OUTPATIENT)
Age: 34
Setting detail: HOSPITAL OUTPATIENT SURGERY
Discharge: HOME | End: 2019-12-03
Attending: INTERNAL MEDICINE | Admitting: INTERNAL MEDICINE
Payer: COMMERCIAL

## 2019-12-03 DIAGNOSIS — K62.5 RECTAL BLEEDING: ICD-10-CM

## 2019-12-03 DIAGNOSIS — K59.00 CONSTIPATION, UNSPECIFIED CONSTIPATION TYPE: ICD-10-CM

## 2019-12-03 DIAGNOSIS — R14.0 ABDOMINAL BLOATING: ICD-10-CM

## 2019-12-03 PROCEDURE — 99070 SPECIAL SUPPLIES PHYS/QHP: CPT | Performed by: INTERNAL MEDICINE

## 2019-12-03 PROCEDURE — 45380 COLONOSCOPY AND BIOPSY: CPT | Performed by: INTERNAL MEDICINE

## 2019-12-03 RX ORDER — SODIUM CHLORIDE, SODIUM LACTATE, POTASSIUM CHLORIDE, CALCIUM CHLORIDE 600; 310; 30; 20 MG/100ML; MG/100ML; MG/100ML; MG/100ML
INJECTION, SOLUTION INTRAVENOUS CONTINUOUS
Status: DISCONTINUED | OUTPATIENT
Start: 2019-12-03 | End: 2019-12-03

## 2019-12-03 RX ADMIN — SODIUM CHLORIDE, SODIUM LACTATE, POTASSIUM CHLORIDE, CALCIUM CHLORIDE: 600; 310; 30; 20 INJECTION, SOLUTION INTRAVENOUS at 12:58:00

## 2019-12-03 NOTE — H&P
History & Physical Examination    Patient Name: Roni Daily  MRN: [id number]  CSN: [id number]  YOB: 1985    Diagnosis: rectal bleeding    linaCLOtide (LINZESS) 72 MCG Oral Cap, Take 1 tablet by mouth daily. , Disp: 30 capsule, Rfl: 5, 12/3/201 Gastroenterology  12/3/2019  12:41 PM

## 2019-12-03 NOTE — OPERATIVE REPORT
Colonoscopy Report    Roni Daily     1985 Age 30year old   PCP Kellen Anderson MD Endoscopist Deana Jo MD     Date of procedure: 19    Procedure: Colonoscopy w/ biopsy    Pre-operative diagnosis: rectal bleeding    Post-operative stable.     Estimated blood loss: insignificant    Specimens collected:  Colon polyp    Complications: none     Colonoscopy findings:  Terminal ileum: normal mucosa    Cecum: normal mucosa and vascular pattern    Ascending colon: normal mucosa and vascular

## 2019-12-03 NOTE — ANESTHESIA POSTPROCEDURE EVALUATION
Patient: Tanika Cervantes    Procedure Summary     Date:  12/03/19 Room / Location:  [facility] ENDOSCOPY 01 / [facility] ENDO    Anesthesia Start:  5937 Anesthesia Stop:      Procedure:  COLONOSCOPY (N/A ) Diagnosis:       Abdominal bloating      Constipation, unspecifi

## 2019-12-03 NOTE — ANESTHESIA PREPROCEDURE EVALUATION
Anesthesia PreOp Note    HPI:     Chelsi Sahu is a 30year old female who presents for preoperative consultation requested by: Ann-Marie Erickson MD    Date of Surgery: 12/3/2019    Procedure(s):  COLONOSCOPY  Indication: Abdominal bloating, Constipation, every morning., Disp: 30 capsule, Rfl: 1, 11/26/2019  gabapentin 300 MG Oral Cap, , Disp: , Rfl: 0, Not Taking  PRENATAL 27-0.8 MG Oral Tab, Take 1 tablet by mouth., Disp: , Rfl: , Not Taking  Biotin 2500 MCG Oral Cap, 1 capsule, Disp: , Rfl: , Not Taking organizations: Not on file        Relationship status: Not on file      Intimate partner violence:        Fear of current or ex partner: Not on file        Emotionally abused: Not on file        Physically abused: Not on file        Forced sexual activity:

## 2019-12-04 VITALS
HEART RATE: 65 BPM | SYSTOLIC BLOOD PRESSURE: 103 MMHG | BODY MASS INDEX: 24.75 KG/M2 | HEIGHT: 64 IN | DIASTOLIC BLOOD PRESSURE: 81 MMHG | RESPIRATION RATE: 15 BRPM | OXYGEN SATURATION: 99 % | WEIGHT: 145 LBS

## 2019-12-05 ENCOUNTER — TELEPHONE (OUTPATIENT)
Dept: GASTROENTEROLOGY | Facility: CLINIC | Age: 34
End: 2019-12-05

## 2019-12-05 NOTE — TELEPHONE ENCOUNTER
I mailed out colonoscopy results letter to JOSEFINA salguero done 12/03/19 No recall, repeat @ age 50   Updated health maintenance

## 2019-12-06 ENCOUNTER — PATIENT MESSAGE (OUTPATIENT)
Dept: GASTROENTEROLOGY | Facility: CLINIC | Age: 34
End: 2019-12-06

## 2019-12-06 RX ORDER — PHENTERMINE HYDROCHLORIDE 30 MG/1
30 CAPSULE ORAL EVERY MORNING
Qty: 30 CAPSULE | Refills: 1 | Status: SHIPPED | OUTPATIENT
Start: 2019-12-06 | End: 2019-12-16 | Stop reason: DRUGHIGH

## 2019-12-06 NOTE — TELEPHONE ENCOUNTER
From: Randy Deluca  To: Luca Reyna MD  Sent: 12/6/2019 6:53 AM CST  Subject: Non-Urgent Medical Question    Morning Dr Ann Slider,     Wanted to ask if the magnesium citrate is something I can/should keep on hand for if my symptoms build back up.  It

## 2019-12-16 RX ORDER — PHENTERMINE HYDROCHLORIDE 37.5 MG/1
TABLET ORAL
Qty: 30 TABLET | Refills: 2 | Status: SHIPPED | OUTPATIENT
Start: 2019-12-16 | End: 2020-01-07

## 2020-01-07 ENCOUNTER — OFFICE VISIT (OUTPATIENT)
Dept: SURGERY | Facility: CLINIC | Age: 35
End: 2020-01-07
Payer: COMMERCIAL

## 2020-01-07 VITALS
HEART RATE: 75 BPM | OXYGEN SATURATION: 98 % | WEIGHT: 145 LBS | SYSTOLIC BLOOD PRESSURE: 114 MMHG | DIASTOLIC BLOOD PRESSURE: 83 MMHG | BODY MASS INDEX: 24.75 KG/M2 | HEIGHT: 64 IN

## 2020-01-07 DIAGNOSIS — R53.82 CHRONIC FATIGUE: ICD-10-CM

## 2020-01-07 DIAGNOSIS — Z51.81 ENCOUNTER FOR THERAPEUTIC DRUG MONITORING: Primary | ICD-10-CM

## 2020-01-07 DIAGNOSIS — R63.5 WEIGHT GAIN: ICD-10-CM

## 2020-01-07 DIAGNOSIS — E66.3 OVERWEIGHT (BMI 25.0-29.9): ICD-10-CM

## 2020-01-07 PROCEDURE — 99214 OFFICE O/P EST MOD 30 MIN: CPT | Performed by: INTERNAL MEDICINE

## 2020-01-07 RX ORDER — PHENTERMINE HYDROCHLORIDE 37.5 MG/1
TABLET ORAL
Qty: 45 TABLET | Refills: 2 | Status: SHIPPED | OUTPATIENT
Start: 2020-01-07 | End: 2020-02-05

## 2020-01-07 NOTE — PROGRESS NOTES
[address]  Dept: [phone]       Patient:  Malathi Hobson  :      1985  MRN:      [id number]    Chief Complaint:  Patient presents with Not on file      Food insecurity:        Worry: Not on file        Inability: Not on file      Transportation needs:        Medical: Not on file        Non-medical: Not on file    Tobacco Use      Smoking status: Never Smoker      Smokeless tobacco: Never it takes to consume a meal:  20  · # of snacks per day: 1 Type of snacks:  fruit  · Amount of soda consumption per day:    · Amount of water (in ounces) per day:  64  · Drinking between meals only:  yes  · Toughest challenge:      Nutritional Goals  Limit bariatric surgery. Patient desires to pursue traditional weight loss at this time. Constipation: OTC  Should increase water intake  Fruit intake    Goals for next month:  1. Keep a food log. 2. Drink 48-64 ounces of non-caloric beverages per day.  No

## 2020-01-16 ENCOUNTER — TELEPHONE (OUTPATIENT)
Dept: SURGERY | Facility: CLINIC | Age: 35
End: 2020-01-16

## 2020-01-16 NOTE — TELEPHONE ENCOUNTER
PATIENT STATES THAT PRIOR AUTH IS NEEDED FOR TRULANCE, INSURANCE NEEDS TO KNOW WHICH MEDICATION IT IS REPLACING.

## 2020-01-22 RX ORDER — AZITHROMYCIN 250 MG/1
TABLET, FILM COATED ORAL
Qty: 1 PACKAGE | Refills: 0 | Status: SHIPPED | OUTPATIENT
Start: 2020-01-22 | End: 2020-07-01

## 2020-01-23 NOTE — TELEPHONE ENCOUNTER
Patient complained of sinus and ear pain with a history of sinusitis and ottitis media for which z-paks have helped in the past.  She will be flying for vacation soon, so I called in a z-mychal for her.

## 2020-02-05 RX ORDER — PHENTERMINE HYDROCHLORIDE 37.5 MG/1
TABLET ORAL
Qty: 45 TABLET | Refills: 2 | Status: SHIPPED | OUTPATIENT
Start: 2020-02-05 | End: 2020-04-08

## 2020-02-15 ENCOUNTER — PATIENT MESSAGE (OUTPATIENT)
Dept: GASTROENTEROLOGY | Facility: CLINIC | Age: 35
End: 2020-02-15

## 2020-02-15 DIAGNOSIS — K59.04 CHRONIC IDIOPATHIC CONSTIPATION: Primary | ICD-10-CM

## 2020-02-17 NOTE — TELEPHONE ENCOUNTER
From: Jeremi Lawler  To: Marie Phillip MD  Sent: 2/15/2020 9:36 AM CST  Subject: Prescription Question    Can I get the linzess prescription resent to my pharmacy please?  When I first started it, I made it to the autorefill time before I used it up and

## 2020-02-18 NOTE — TELEPHONE ENCOUNTER
Dr. Karen Felipe-    Patient has tried Trulance and was previously on Linzess. Since [facility] has changed their formulary and now Trulance is their preferred, she is agreeable to trying it.      Please note that the copay is the same for a 30 or 90 day supply of Trulance

## 2020-02-18 NOTE — TELEPHONE ENCOUNTER
Will prescribe trulance for chronic idiopathic constipation which was noted in my note from 2017.     Prescribed    Roxanna Lujan MD  [facility], [facility] - Gastroenterology  2/18/2020  11:18 AM

## 2020-02-26 ENCOUNTER — TELEPHONE (OUTPATIENT)
Dept: SURGERY | Facility: CLINIC | Age: 35
End: 2020-02-26

## 2020-02-26 NOTE — TELEPHONE ENCOUNTER
Called to let us know she may be pregnant. I recommended she discontinues phentermine until she confirms pregnancy.

## 2020-03-06 RX ORDER — PHENTERMINE HYDROCHLORIDE 37.5 MG/1
TABLET ORAL
Qty: 45 TABLET | Refills: 2 | OUTPATIENT
Start: 2020-03-06

## 2020-03-13 NOTE — TELEPHONE ENCOUNTER
Medication PA Requested: Trulance 3 mg  CoverMyMeds Used: Yes  Key: [id number]  Sig: Take 1 tablet by mouth daily.   DX Code: CIC K59.04

## 2020-03-13 NOTE — TELEPHONE ENCOUNTER
Orly Louis-    Please refer to patient mychart message below. Please assist w/ Trulance PA, thank you.

## 2020-03-13 NOTE — TELEPHONE ENCOUNTER
Dr. Reggie mcclain has denied the Connie Stephen stating the patient has to fail Tier 1-3 alternative in the last 365 days, which is Lactulose solution.      \"Your doctor must give us documentation, such as medical records including chart notes to

## 2020-03-16 NOTE — TELEPHONE ENCOUNTER
Spoke to patient reviewed ALL information with [employer] with Trulance (preferred, but denied), Linzess (non-formulary), and Lactulose (alternative approved by [employer]).      She state she is willing to try the lactulose for now and was wondering if we could try an

## 2020-03-17 RX ORDER — LACTULOSE 20 G/30ML
20 SOLUTION ORAL DAILY
Qty: 1 BOTTLE | Refills: 1 | Status: SHIPPED | OUTPATIENT
Start: 2020-03-17 | End: 2020-03-19 | Stop reason: ALTCHOICE

## 2020-03-17 NOTE — TELEPHONE ENCOUNTER
To whom it may concern:    Ms. Sharath Parsons is under my gastrointestinal care.  She has known chronic idiopathic constipation and irritable bowel syndrome with constipation for which she has failed several other available therapies including magnesium citrate,

## 2020-03-19 NOTE — TELEPHONE ENCOUNTER
Spoke to Reddy at [employer] ([phone]) and she states the linzess went through insurance for 90 tabs/35$ and the lactulose went through for 0$.     Please advise if OK to d/c order for lactulose and patient take linzess only, thank you.     Note: No need to

## 2020-03-19 NOTE — TELEPHONE ENCOUNTER
Discussed w/ Dr. Knight Bearded- OK to d/c the lactulose. Spoke to [place] at [facility] and she d/c the lactulose and confirmed there is PAID insurance claim for linzess. She will fill for patient and notify once ready for pickup. Pt contacted and informed of all.  No

## 2020-03-27 ENCOUNTER — NURSE TRIAGE (OUTPATIENT)
Dept: FAMILY MEDICINE CLINIC | Facility: CLINIC | Age: 35
End: 2020-03-27

## 2020-03-27 ENCOUNTER — PATIENT MESSAGE (OUTPATIENT)
Dept: FAMILY MEDICINE CLINIC | Facility: CLINIC | Age: 35
End: 2020-03-27

## 2020-03-27 RX ORDER — SULFAMETHOXAZOLE AND TRIMETHOPRIM 800; 160 MG/1; MG/1
1 TABLET ORAL 2 TIMES DAILY
Qty: 6 TABLET | Refills: 0 | Status: SHIPPED | OUTPATIENT
Start: 2020-03-27 | End: 2020-07-01

## 2020-03-27 NOTE — TELEPHONE ENCOUNTER
RN assigned to Acute encounters, please call patient and triage regarding MyChart message copied here. MyChart message response sent in original MyChart encounter, per department process. ----- Message from Antonette Tracy sent at 3/27/2020  8:48 AM CDT ----

## 2020-03-27 NOTE — TELEPHONE ENCOUNTER
I am sending bactrim BID x 3 days. If anything changes, worsening symptoms, new fevers. Pt to call. Increase fluids.

## 2020-03-27 NOTE — TELEPHONE ENCOUNTER
From: Mireille Bates  To: Igor Schmitt MD  Sent: 3/27/2020 8:48 AM CDT  Subject: Non-Urgent Medical Question    I think I have a UTI or bladder infection - I have the urge to urinate constantly and have cloudy urine.  Plus I have sort of constant really lo

## 2020-03-27 NOTE — TELEPHONE ENCOUNTER
Action Requested: Summary for Provider     []  Critical Lab, Recommendations Needed  [x] Need Additional Advice  []   FYI    []   Need Orders  [] Need Medications Sent to Pharmacy  []  Other     SUMMARY:  Patient states she was diagnosed with a \"chemical

## 2020-04-08 ENCOUNTER — VIRTUAL PHONE E/M (OUTPATIENT)
Dept: SURGERY | Facility: CLINIC | Age: 35
End: 2020-04-08
Payer: COMMERCIAL

## 2020-04-08 DIAGNOSIS — R53.82 CHRONIC FATIGUE: ICD-10-CM

## 2020-04-08 DIAGNOSIS — Z51.81 ENCOUNTER FOR THERAPEUTIC DRUG MONITORING: Primary | ICD-10-CM

## 2020-04-08 PROCEDURE — 99213 OFFICE O/P EST LOW 20 MIN: CPT | Performed by: INTERNAL MEDICINE

## 2020-04-08 RX ORDER — PHENTERMINE HYDROCHLORIDE 37.5 MG/1
TABLET ORAL
Qty: 45 TABLET | Refills: 2 | Status: SHIPPED | OUTPATIENT
Start: 2020-04-08 | End: 2020-07-15

## 2020-04-08 NOTE — PROGRESS NOTES
Virtual/Telephone Check-In    Claudio Rachel Cox verbally consents to a Virtual/Telephone Check-In service on 04/08/20. Patient understands and accepts financial responsibility for any deductible, co-insurance and/or co-pays associated with this service.     Dura

## 2020-07-15 RX ORDER — PHENTERMINE HYDROCHLORIDE 37.5 MG/1
TABLET ORAL
Qty: 45 TABLET | Refills: 2 | Status: SHIPPED | OUTPATIENT
Start: 2020-07-15 | End: 2020-10-26

## 2020-10-03 ENCOUNTER — TELEPHONE (OUTPATIENT)
Dept: FAMILY MEDICINE CLINIC | Facility: CLINIC | Age: 35
End: 2020-10-03

## 2020-10-03 ENCOUNTER — PATIENT MESSAGE (OUTPATIENT)
Dept: FAMILY MEDICINE CLINIC | Facility: CLINIC | Age: 35
End: 2020-10-03

## 2020-10-03 NOTE — TELEPHONE ENCOUNTER
Jessica message sent. RN=call and triage      ----- Message from Bennet Bamberger. Cox sent at 10/3/2020  8:31 AM CDT -----  Regarding: Other  Contact: [phone]  I think I'm developing pink eye (or maybe a sty?) in my left eye.  I've been experiencing pain unde

## 2020-10-03 NOTE — TELEPHONE ENCOUNTER
On call:    Patient paged me with complains of having eye problem. She states there is minimal redness but her eye hurt when she blinks and she does not know if she got something in the eye as she was cleaning her garage recently.   There is some discharge

## 2020-10-03 NOTE — TELEPHONE ENCOUNTER
See acute telephone encounter 10/3/20. From: Stephanie Farris  To: Yamileth Lin MD  Sent: 10/3/2020  8:31 AM CDT  Subject: Other    I think I'm developing pink eye (or maybe a sty?) in my left eye.  I've been experiencing pain under my eye lid, I was c

## 2020-10-08 ENCOUNTER — PATIENT MESSAGE (OUTPATIENT)
Dept: ENDOCRINOLOGY CLINIC | Facility: CLINIC | Age: 35
End: 2020-10-08

## 2020-10-09 NOTE — TELEPHONE ENCOUNTER
Okay to forward results of labs ordered by e in July 2019 as requested  Please inform patient on my chart once results have been sent  Thanks

## 2020-10-12 NOTE — TELEPHONE ENCOUNTER
Obtained fax number of [phone]. Lab results from 07/2019 faxed as requested and notified patient via [employer].

## 2020-10-26 RX ORDER — PHENTERMINE HYDROCHLORIDE 37.5 MG/1
TABLET ORAL
Qty: 45 TABLET | Refills: 2 | OUTPATIENT
Start: 2020-10-26

## 2020-10-27 RX ORDER — PHENTERMINE HYDROCHLORIDE 37.5 MG/1
TABLET ORAL
Qty: 45 TABLET | Refills: 0 | Status: SHIPPED | OUTPATIENT
Start: 2020-10-27 | End: 2020-10-30

## 2020-10-30 ENCOUNTER — OFFICE VISIT (OUTPATIENT)
Dept: SURGERY | Facility: CLINIC | Age: 35
End: 2020-10-30
Payer: COMMERCIAL

## 2020-10-30 VITALS
SYSTOLIC BLOOD PRESSURE: 123 MMHG | HEART RATE: 89 BPM | HEIGHT: 64 IN | OXYGEN SATURATION: 100 % | BODY MASS INDEX: 22.02 KG/M2 | WEIGHT: 129 LBS | DIASTOLIC BLOOD PRESSURE: 88 MMHG

## 2020-10-30 DIAGNOSIS — E66.3 OVERWEIGHT (BMI 25.0-29.9): ICD-10-CM

## 2020-10-30 DIAGNOSIS — R53.82 CHRONIC FATIGUE: Primary | ICD-10-CM

## 2020-10-30 DIAGNOSIS — Z51.81 ENCOUNTER FOR THERAPEUTIC DRUG MONITORING: ICD-10-CM

## 2020-10-30 PROCEDURE — 99214 OFFICE O/P EST MOD 30 MIN: CPT | Performed by: INTERNAL MEDICINE

## 2020-10-30 PROCEDURE — 3008F BODY MASS INDEX DOCD: CPT | Performed by: INTERNAL MEDICINE

## 2020-10-30 PROCEDURE — 3074F SYST BP LT 130 MM HG: CPT | Performed by: INTERNAL MEDICINE

## 2020-10-30 PROCEDURE — 3079F DIAST BP 80-89 MM HG: CPT | Performed by: INTERNAL MEDICINE

## 2020-10-30 RX ORDER — PHENTERMINE HYDROCHLORIDE 37.5 MG/1
TABLET ORAL
Qty: 45 TABLET | Refills: 2 | Status: SHIPPED | OUTPATIENT
Start: 2020-11-27 | End: 2021-02-03

## 2020-10-30 NOTE — PROGRESS NOTES
[address]  Dept: [phone]       Patient:  Ratna Andrade  :      1985  MRN:      [id number]    Chief Complaint:  Patient presents with and Sexual Activity      Alcohol use: Yes        Comment: occ      Drug use: No      Sexual activity: Yes        Partners: Male        Birth control/protection: Condom    Lifestyle      Physical activity        Days per week: Not on file        Minutes per challenge:      Nutritional Goals  Limit carbohydrates to 100 gms per day, Eat 100-200 calories within 1 hour of waking  and Eat 3-4 cups of fresh fruits or vegetables daily    Behavior Modifications Reviewed and Discussed  Eat breakfast, Eat 3 meals per d non-caloric beverages per day. No fruit juices or regular soda. 3. Increase activity-upper body exercises, walk 10 minutes per day. 4. Increase fruit and vegetable servings to 5-6 per day.       Tolerating phentermine  Will refill  ekg due      Constipati

## 2020-12-10 ENCOUNTER — OFFICE VISIT (OUTPATIENT)
Dept: FAMILY MEDICINE CLINIC | Facility: CLINIC | Age: 35
End: 2020-12-10
Payer: COMMERCIAL

## 2020-12-10 ENCOUNTER — LAB ENCOUNTER (OUTPATIENT)
Dept: LAB | Age: 35
End: 2020-12-10
Attending: FAMILY MEDICINE
Payer: COMMERCIAL

## 2020-12-10 VITALS
HEART RATE: 97 BPM | SYSTOLIC BLOOD PRESSURE: 121 MMHG | WEIGHT: 128 LBS | DIASTOLIC BLOOD PRESSURE: 81 MMHG | HEIGHT: 64 IN | BODY MASS INDEX: 21.85 KG/M2

## 2020-12-10 DIAGNOSIS — R59.1 LYMPHADENOPATHY: ICD-10-CM

## 2020-12-10 DIAGNOSIS — R59.1 LYMPHADENOPATHY: Primary | ICD-10-CM

## 2020-12-10 PROCEDURE — 80053 COMPREHEN METABOLIC PANEL: CPT

## 2020-12-10 PROCEDURE — 3074F SYST BP LT 130 MM HG: CPT | Performed by: FAMILY MEDICINE

## 2020-12-10 PROCEDURE — 84443 ASSAY THYROID STIM HORMONE: CPT

## 2020-12-10 PROCEDURE — 3079F DIAST BP 80-89 MM HG: CPT | Performed by: FAMILY MEDICINE

## 2020-12-10 PROCEDURE — 85025 COMPLETE CBC W/AUTO DIFF WBC: CPT

## 2020-12-10 PROCEDURE — 36415 COLL VENOUS BLD VENIPUNCTURE: CPT

## 2020-12-10 PROCEDURE — 99213 OFFICE O/P EST LOW 20 MIN: CPT | Performed by: FAMILY MEDICINE

## 2020-12-10 PROCEDURE — 3008F BODY MASS INDEX DOCD: CPT | Performed by: FAMILY MEDICINE

## 2020-12-10 NOTE — PROGRESS NOTES
12/10/2020  10:44 AM    Ambika Todd is a 37year old female.     Chief complaint(s): Patient presents with:  Mass: right neck mass x2 months, has not changed in size   Itchiness: bilateral axilla itchiness     HPI:     Ambika Todd primary complaint is regar 09/01/2020      HPV (Gardasil)        05/09/2007 11/20/2007      TDAP                  09/28/2015      Medications (Active prior to today's visit):  Current Outpatient Medications   Medication Sig Dispense Refill   • hydrocortisone 2.5 % External Cream Lymphadenopathy  (primary encounter diagnosis)    Patient reassured, I don't believe she has an under lying malignancy. Will order a few test today. Lymph nodes should resolved in time but if new signs or symptoms developed.      MEDICATIONS:     Requeste

## 2021-02-03 ENCOUNTER — OFFICE VISIT (OUTPATIENT)
Dept: SURGERY | Facility: CLINIC | Age: 36
End: 2021-02-03
Payer: COMMERCIAL

## 2021-02-03 VITALS
WEIGHT: 133.31 LBS | DIASTOLIC BLOOD PRESSURE: 77 MMHG | SYSTOLIC BLOOD PRESSURE: 116 MMHG | HEIGHT: 64 IN | HEART RATE: 96 BPM | BODY MASS INDEX: 22.76 KG/M2 | OXYGEN SATURATION: 100 %

## 2021-02-03 DIAGNOSIS — Z51.81 ENCOUNTER FOR THERAPEUTIC DRUG MONITORING: ICD-10-CM

## 2021-02-03 DIAGNOSIS — E66.3 OVERWEIGHT (BMI 25.0-29.9): ICD-10-CM

## 2021-02-03 DIAGNOSIS — R53.82 CHRONIC FATIGUE: Primary | ICD-10-CM

## 2021-02-03 PROCEDURE — 3078F DIAST BP <80 MM HG: CPT | Performed by: INTERNAL MEDICINE

## 2021-02-03 PROCEDURE — 3008F BODY MASS INDEX DOCD: CPT | Performed by: INTERNAL MEDICINE

## 2021-02-03 PROCEDURE — 3074F SYST BP LT 130 MM HG: CPT | Performed by: INTERNAL MEDICINE

## 2021-02-03 PROCEDURE — 99214 OFFICE O/P EST MOD 30 MIN: CPT | Performed by: INTERNAL MEDICINE

## 2021-02-03 RX ORDER — PHENTERMINE HYDROCHLORIDE 37.5 MG/1
TABLET ORAL
Qty: 45 TABLET | Refills: 2 | Status: SHIPPED | OUTPATIENT
Start: 2021-02-03 | End: 2021-04-28

## 2021-02-03 NOTE — PROGRESS NOTES
[address]  Dept: [phone]       Patient:  Tonya Agarwal  :      1985  MRN:      [id number]    Chief Complaint:  Patient presents with Activity      Alcohol use: Yes        Comment: occ      Drug use: No      Sexual activity: Yes        Partners: Male        Birth control/protection: Condom    Lifestyle      Physical activity        Days per week: Not on file        Minutes per session: N Nutritional Goals  Limit carbohydrates to 100 gms per day, Eat 100-200 calories within 1 hour of waking  and Eat 3-4 cups of fresh fruits or vegetables daily    Behavior Modifications Reviewed and Discussed  Eat breakfast, Eat 3 meals per day, Plan janeth non-caloric beverages per day. No fruit juices or regular soda. 3. Increase activity-upper body exercises, walk 10 minutes per day. 4. Increase fruit and vegetable servings to 5-6 per day.       Tolerating phentermine  Will refill  ekg due      Constipati

## 2021-02-09 ENCOUNTER — TELEPHONE (OUTPATIENT)
Dept: FAMILY MEDICINE CLINIC | Facility: CLINIC | Age: 36
End: 2021-02-09

## 2021-02-09 NOTE — TELEPHONE ENCOUNTER
----- Message from Luís Tracy sent at 2/9/2021  7:22 AM CST -----  Regarding: Visit Follow-up Question  Contact: [phone]  Is there bloodwork that would show lupus erythematosus that I could have done?  I had indicators for it years ago after a skin b

## 2021-02-09 NOTE — TELEPHONE ENCOUNTER
Spoke with pt,  verified, pt c/o lump on her neck, swollen lymphnodes, pt was seen regarding this issue by Dr Alejandra Taylor before. Pt sx is not getting worse, just mentally wondering why she had a swollen lymph nodes?    She read something in the internet a

## 2021-02-11 NOTE — TELEPHONE ENCOUNTER
Patient read MyChart on 2/10/21;     From   Valery Soto RN To   Davonte Martell and Delivered   2/10/2021  3:03 PM   Last Read in [address]   2/10/2021  8:54 PM by Jeremi Lawler

## 2021-04-20 ENCOUNTER — OFFICE VISIT (OUTPATIENT)
Dept: INTEGRATIVE MEDICINE | Facility: CLINIC | Age: 36
End: 2021-04-20
Payer: COMMERCIAL

## 2021-04-20 ENCOUNTER — LAB ENCOUNTER (OUTPATIENT)
Dept: LAB | Facility: HOSPITAL | Age: 36
End: 2021-04-20
Attending: FAMILY MEDICINE
Payer: COMMERCIAL

## 2021-04-20 VITALS
OXYGEN SATURATION: 97 % | WEIGHT: 132.38 LBS | SYSTOLIC BLOOD PRESSURE: 110 MMHG | BODY MASS INDEX: 22.6 KG/M2 | HEART RATE: 84 BPM | DIASTOLIC BLOOD PRESSURE: 76 MMHG | HEIGHT: 64 IN

## 2021-04-20 DIAGNOSIS — K59.00 CONSTIPATION, UNSPECIFIED CONSTIPATION TYPE: ICD-10-CM

## 2021-04-20 DIAGNOSIS — M54.2 NECK PAIN: ICD-10-CM

## 2021-04-20 DIAGNOSIS — R14.0 BLOATING: ICD-10-CM

## 2021-04-20 DIAGNOSIS — M50.90 CERVICAL DISC DISEASE: ICD-10-CM

## 2021-04-20 DIAGNOSIS — K59.00 CONSTIPATION, UNSPECIFIED CONSTIPATION TYPE: Primary | ICD-10-CM

## 2021-04-20 DIAGNOSIS — M54.12 CERVICAL RADICULOPATHY: ICD-10-CM

## 2021-04-20 PROBLEM — O03.4 RETAINED PRODUCTS OF CONCEPTION AFTER MISCARRIAGE (HCC): Status: ACTIVE | Noted: 2019-01-28

## 2021-04-20 PROBLEM — R11.2 NON-INTRACTABLE VOMITING WITH NAUSEA: Status: RESOLVED | Noted: 2017-11-08 | Resolved: 2021-04-20

## 2021-04-20 PROBLEM — Z98.891 HISTORY OF CESAREAN SECTION: Status: ACTIVE | Noted: 2021-04-20

## 2021-04-20 PROBLEM — M77.8 RIGHT WRIST TENDONITIS: Status: RESOLVED | Noted: 2019-07-08 | Resolved: 2021-04-20

## 2021-04-20 PROBLEM — G56.03 BILATERAL CARPAL TUNNEL SYNDROME: Status: RESOLVED | Noted: 2019-07-08 | Resolved: 2021-04-20

## 2021-04-20 PROBLEM — O03.4 RETAINED PRODUCTS OF CONCEPTION AFTER MISCARRIAGE: Status: RESOLVED | Noted: 2019-01-28 | Resolved: 2021-04-20

## 2021-04-20 PROBLEM — Z98.890 S/P D&C (STATUS POST DILATION AND CURETTAGE): Status: ACTIVE | Noted: 2019-01-28

## 2021-04-20 PROBLEM — O03.4 RETAINED PRODUCTS OF CONCEPTION AFTER MISCARRIAGE (HCC): Status: RESOLVED | Noted: 2019-01-28 | Resolved: 2021-04-20

## 2021-04-20 PROBLEM — O03.4 RETAINED PRODUCTS OF CONCEPTION AFTER MISCARRIAGE: Status: ACTIVE | Noted: 2019-01-28

## 2021-04-20 PROCEDURE — 3078F DIAST BP <80 MM HG: CPT | Performed by: FAMILY MEDICINE

## 2021-04-20 PROCEDURE — 86628 CANDIDA ANTIBODY: CPT

## 2021-04-20 PROCEDURE — 36415 COLL VENOUS BLD VENIPUNCTURE: CPT

## 2021-04-20 PROCEDURE — 3074F SYST BP LT 130 MM HG: CPT | Performed by: FAMILY MEDICINE

## 2021-04-20 PROCEDURE — 3008F BODY MASS INDEX DOCD: CPT | Performed by: FAMILY MEDICINE

## 2021-04-20 PROCEDURE — 99204 OFFICE O/P NEW MOD 45 MIN: CPT | Performed by: FAMILY MEDICINE

## 2021-04-20 NOTE — PROGRESS NOTES
Cristina Franks is a 37year old female. Patient presents with:  New Patient  Integrative Medicine Consult: bloating issues      HPI:     Has been working with Dr. Amor Allen for the past 1.5 years for weight loss. Taking phentermine. Eats pretty healthy.    G half tablet at noon 45 tablet 2       SOCIAL HISTORY:   Social History    Socioeconomic History      Marital status:       Spouse name: Not on file      Number of children: 2      Years of education: Not on file      Highest education level: Not on Height: 5' 4\" (1.626 m)       Physical Exam  HENT:      Head: Normocephalic and atraumatic. Eyes:      Conjunctiva/sclera: Conjunctivae normal.      Pupils: Pupils are equal, round, and reactive to light. Neck:      Thyroid: No thyromegaly.    Nadean Last 30* 37 - 98 U/L Final   • Bilirubin, Total 12/10/2020 0.7  0.1 - 2.0 mg/dL Final   • Total Protein 12/10/2020 7.2  6.4 - 8.2 g/dL Final   • Albumin 12/10/2020 4.2  3.4 - 5.0 g/dL Final   • Globulin  12/10/2020 3.0  2.8 - 4.4 g/dL Final   • A/G Ratio 12/10/ Absolute 12/10/2020 0.00  0.00 - 1.00 x10(3) uL Final   • Neutrophil % 12/10/2020 45.6  % Final   • Lymphocyte % 12/10/2020 43.0  % Final   • Monocyte % 12/10/2020 8.4  % Final   • Eosinophil % 12/10/2020 2.5  % Final   • Basophil % 12/10/2020 0.5  % Final injuries or medical conditions. The patient agrees that the [facility] and its affiliates and its [facility] are not liable for the patients use of the Products.  [facility] makes no representations or warranties of any kin many benefits, including: uncovering which foods are causing inflammation and disease, developing a personalized nutritional guide, and improving a patient’s state of health and energy levels.      This test is not covered by insurance and costs $100    See

## 2021-04-20 NOTE — PATIENT INSTRUCTIONS
I have complete yin in the body's ability to heal and transform. The products and items listed below (the “Products”)  and their claims have not been evaluated by the Food and Drug Administration.  Dietary products are not intended to treat, prevent, m INTEGRATIVEMED  Practitioner's Last Name: Elias Paredes the recommended products and they will be sent directly to your home address. Colon Clear by [employer]    Directions: 1 capsule daily  Where to Purchase: [email or web address]

## 2021-04-28 ENCOUNTER — OFFICE VISIT (OUTPATIENT)
Dept: SURGERY | Facility: CLINIC | Age: 36
End: 2021-04-28
Payer: COMMERCIAL

## 2021-04-28 VITALS
SYSTOLIC BLOOD PRESSURE: 121 MMHG | WEIGHT: 132 LBS | OXYGEN SATURATION: 100 % | HEIGHT: 64 IN | BODY MASS INDEX: 22.53 KG/M2 | DIASTOLIC BLOOD PRESSURE: 86 MMHG | HEART RATE: 94 BPM

## 2021-04-28 DIAGNOSIS — E66.3 OVERWEIGHT (BMI 25.0-29.9): ICD-10-CM

## 2021-04-28 DIAGNOSIS — R53.82 CHRONIC FATIGUE: ICD-10-CM

## 2021-04-28 DIAGNOSIS — Z51.81 ENCOUNTER FOR THERAPEUTIC DRUG MONITORING: Primary | ICD-10-CM

## 2021-04-28 PROCEDURE — 3079F DIAST BP 80-89 MM HG: CPT | Performed by: INTERNAL MEDICINE

## 2021-04-28 PROCEDURE — 3074F SYST BP LT 130 MM HG: CPT | Performed by: INTERNAL MEDICINE

## 2021-04-28 PROCEDURE — 99213 OFFICE O/P EST LOW 20 MIN: CPT | Performed by: INTERNAL MEDICINE

## 2021-04-28 PROCEDURE — 3008F BODY MASS INDEX DOCD: CPT | Performed by: INTERNAL MEDICINE

## 2021-04-28 RX ORDER — PHENTERMINE HYDROCHLORIDE 37.5 MG/1
TABLET ORAL
Qty: 45 TABLET | Refills: 2 | Status: SHIPPED | OUTPATIENT
Start: 2021-04-28 | End: 2021-09-07

## 2021-04-28 NOTE — PROGRESS NOTES
[address], [address]  Dept: [phone]       Patient:  Ferny Sevilla  :      1985  MRN:      [id number]    Chief Complaint:  Patient presents with on file    Social Determinants of Health  Financial Resource Strain:       Difficulty of Paying Living Expenses:   Food Insecurity:       Worried About Running Out of Food in the Last Year:       Ran Out of Food in the Last Year:   Transportation Needs: challenge:      Nutritional Goals  Limit carbohydrates to 100 gms per day, Eat 100-200 calories within 1 hour of waking  and Eat 3-4 cups of fresh fruits or vegetables daily    Behavior Modifications Reviewed and Discussed  Eat breakfast, Eat 3 meals per d day. No fruit juices or regular soda. 3. Increase activity-upper body exercises, walk 10 minutes per day. 4. Increase fruit and vegetable servings to 5-6 per day.       Tolerating phentermine  Will refill  ekg due          Diagnoses and all orders for [facility]

## 2021-05-03 ENCOUNTER — PATIENT MESSAGE (OUTPATIENT)
Dept: GASTROENTEROLOGY | Facility: CLINIC | Age: 36
End: 2021-05-03

## 2021-05-04 RX ORDER — LINACLOTIDE 72 UG/1
1 CAPSULE, GELATIN COATED ORAL DAILY
Qty: 90 CAPSULE | Refills: 0 | Status: SHIPPED | OUTPATIENT
Start: 2021-05-04 | End: 2021-06-03

## 2021-05-04 NOTE — TELEPHONE ENCOUNTER
GI staff:    Please let her now I've prescribed linzess. I recommend routine follow up to see how she is doing see if she needs escalation in dose or other evaluation or management.     Thanks    Kendall Martino MD  [facility] - Gastroenterology  5/

## 2021-05-04 NOTE — TELEPHONE ENCOUNTER
From: Stephanie Farris  To: Ronal Pacheco MD  Sent: 5/3/2021 10:43 PM CDT  Subject: Prescription Question    Can you provide a prescription refill for linzess please?

## 2021-05-04 NOTE — TELEPHONE ENCOUNTER
linaCLOtide (LINZESS) 72 MCG Oral Cap (Discontinued) 90 capsule       LOV: 11/26/2019  LR: 3/17/2020    Patient requesting refill on Linzess.

## 2021-05-04 NOTE — TELEPHONE ENCOUNTER
Sent patient [employer] message with information below. I instructed patient to use [employer] or call our office to schedule an appt. Patient read message. Last read by Hayder Brennan at 12:27 PM on 5/4/2021.       If patient calls please assist with scheduling

## 2021-05-13 ENCOUNTER — MED REC SCAN ONLY (OUTPATIENT)
Dept: INTEGRATIVE MEDICINE | Facility: CLINIC | Age: 36
End: 2021-05-13

## 2021-08-10 ENCOUNTER — OFFICE VISIT (OUTPATIENT)
Dept: INTEGRATIVE MEDICINE | Facility: CLINIC | Age: 36
End: 2021-08-10
Payer: COMMERCIAL

## 2021-08-10 VITALS
SYSTOLIC BLOOD PRESSURE: 118 MMHG | HEART RATE: 84 BPM | WEIGHT: 132 LBS | HEIGHT: 64 IN | OXYGEN SATURATION: 93 % | BODY MASS INDEX: 22.53 KG/M2 | DIASTOLIC BLOOD PRESSURE: 88 MMHG

## 2021-08-10 DIAGNOSIS — M54.2 NECK PAIN: ICD-10-CM

## 2021-08-10 DIAGNOSIS — K59.00 CONSTIPATION, UNSPECIFIED CONSTIPATION TYPE: Primary | ICD-10-CM

## 2021-08-10 DIAGNOSIS — M54.12 CERVICAL RADICULOPATHY: ICD-10-CM

## 2021-08-10 DIAGNOSIS — R14.0 BLOATING: ICD-10-CM

## 2021-08-10 DIAGNOSIS — M50.90 CERVICAL DISC DISEASE: ICD-10-CM

## 2021-08-10 PROCEDURE — 3079F DIAST BP 80-89 MM HG: CPT | Performed by: FAMILY MEDICINE

## 2021-08-10 PROCEDURE — 3008F BODY MASS INDEX DOCD: CPT | Performed by: FAMILY MEDICINE

## 2021-08-10 PROCEDURE — 99214 OFFICE O/P EST MOD 30 MIN: CPT | Performed by: FAMILY MEDICINE

## 2021-08-10 PROCEDURE — 3074F SYST BP LT 130 MM HG: CPT | Performed by: FAMILY MEDICINE

## 2021-08-10 NOTE — PROGRESS NOTES
Malathi Hobson is a 37year old female. Patient presents with: Follow - Up      HPI:     Eliminated gluten and didn't notice a difference. Tried eliminating eggs and cow dairy as well. Uses half and half in coffee 4 days per week.      Not seeing any diffe Comment: occ      Drug use: No      Sexual activity: Yes        Partners: Male        Birth control/protection: Condom    Other Topics      Concerns:        Not on file    Social History Narrative      Not on file    Social Determinants of An M[facility] 29 and dry. Neurological:      Mental Status: She is alert and oriented to person, place, and time. Cranial Nerves: No cranial nerve deficit. Gait: Gait is intact.    Psychiatric:         Mood and Affect: Affect normal.         ASSESSMENT AND PLAN: antibody. 0.89-0.99 EV: . ......... Equivocal - Questionable presence                              of antibodies. Repeat testing in                              10-14 days may be helpful. 1.00 EV or greater: . ...  Positive - An post-infection. This test was developed and its performance characteristics   determined by [employer]. It has not been cleared                            or   approved by the [employer] and Drug Administration.  This test was   performed in a I discussed. Counseled on and discussed further diet and/or supplement recommendations in detail.   Consider Vibrant Gut Zoomer testing    Given further recommendations as below    Orders Placed This Visit:  No orders of the defined types were placed in [facility] per day or as directed by your healthcare professional.   Where to Purchase: [email or web address]    Interfase Plus by Cora    Directions: 2 capsules twice daily  Where to Find: [email or web address] com        Gut Zoomer by Mark Fitzpatrick IV,  Collinsella  Coprobacillus  Coprococcus  Dermabacter  Desulfovibrio piger  Desulfovibrio  Dialister invisus  Dorea  Dysgonomonas  Edwardsiella  Eggerthella lenta  Enterobacter  Enterobacter aerogenes  Enterobacteria  Enterobacteriaceae  Enterococcus 4  Veillonella  Veillonellaceae  Weissella  Yokenella  Cedecea  Cetobacterium  Coprobacillus  Dysgonomonas  Enterobacter  Hafnia  Holdemania  Megamonas  Mitsuokella  Odoribacter  Paenibacillus  Parabacteroides  Paraprevotella  Porphyromonas  Pseudoflavonif Microbiota Resistance Genes – Fluoroquinolones  Universal Microbiota Resistance Genes – Macrolides  Universal Microbiota Resistance Genes – Vancomycin    With a highly accurate measurement of each microorganism’s relative abundance, the results will guide

## 2021-08-10 NOTE — PATIENT INSTRUCTIONS
I have complete yin in the body's ability to heal and transform. The products and items listed below (the “Products”)  and their claims have not been evaluated by the Food and Drug Administration.  Dietary products are not intended to treat, prevent, m including over 170 species and genus-level measurements, as well as phylum assessments and two diversity indexes. We also provide recommendations for 35 commonly used probiotic products that may be appropriate based on risks determined by lab test results. rectale  Faecalibacterium prausnitzii  Faecalibacterium  Firmicutes  Fusobacteria  Fusobacterium  Haemophilus  Hafnia  Holdemania  Lachnospiraceae  Lactobacillaceae  Lactobacillus  Lactobacillus acidophillus  Lactobacillus animalis  Lactobacillus brevis  L (parahaemolyticus)  Enteropathogenic E.coli (EPEC)  Enterotoxigenic E.coli (ETEC)Lt/St  E.coli O157  Shiga-Like Toxin Producing E.coli(STEC)Stx1/Stx2  Shigella/EIEC  Helicobacter pylori  Listeria  Vibrio (cholerae)  Enteroaggregative E.coli(EAEC)  Wilbert Bob Insufficiency  Calprotection  Fecal Eosinophil Protein X  Fecal lactoferrin  MMP 9  Beta defensin 2  slgA  [id number]  Pancreatic elastase 1  Cholic acid CA  Chenodeoxycholic acid CDCA  Deocycholic acid DCA  Lithocholic acid  LCA/DCA ratio  Lysozyme    Other

## 2021-09-06 DIAGNOSIS — E66.3 OVERWEIGHT (BMI 25.0-29.9): ICD-10-CM

## 2021-09-07 RX ORDER — PHENTERMINE HYDROCHLORIDE 37.5 MG/1
TABLET ORAL
Qty: 45 TABLET | Refills: 0 | Status: SHIPPED | OUTPATIENT
Start: 2021-09-07 | End: 2021-10-29

## 2021-10-29 ENCOUNTER — OFFICE VISIT (OUTPATIENT)
Dept: SURGERY | Facility: CLINIC | Age: 36
End: 2021-10-29
Payer: COMMERCIAL

## 2021-10-29 VITALS
OXYGEN SATURATION: 100 % | SYSTOLIC BLOOD PRESSURE: 118 MMHG | BODY MASS INDEX: 23.47 KG/M2 | WEIGHT: 137.5 LBS | DIASTOLIC BLOOD PRESSURE: 68 MMHG | HEART RATE: 78 BPM | HEIGHT: 64 IN

## 2021-10-29 DIAGNOSIS — Z51.81 ENCOUNTER FOR THERAPEUTIC DRUG MONITORING: ICD-10-CM

## 2021-10-29 DIAGNOSIS — E66.3 OVERWEIGHT (BMI 25.0-29.9): ICD-10-CM

## 2021-10-29 DIAGNOSIS — R53.82 CHRONIC FATIGUE: ICD-10-CM

## 2021-10-29 DIAGNOSIS — K59.00 CONSTIPATION, UNSPECIFIED CONSTIPATION TYPE: ICD-10-CM

## 2021-10-29 DIAGNOSIS — R63.2 INCREASED APPETITE: Primary | ICD-10-CM

## 2021-10-29 PROCEDURE — 3008F BODY MASS INDEX DOCD: CPT | Performed by: INTERNAL MEDICINE

## 2021-10-29 PROCEDURE — 3074F SYST BP LT 130 MM HG: CPT | Performed by: INTERNAL MEDICINE

## 2021-10-29 PROCEDURE — 3078F DIAST BP <80 MM HG: CPT | Performed by: INTERNAL MEDICINE

## 2021-10-29 PROCEDURE — 99214 OFFICE O/P EST MOD 30 MIN: CPT | Performed by: INTERNAL MEDICINE

## 2021-10-29 RX ORDER — PHENTERMINE HYDROCHLORIDE 37.5 MG/1
TABLET ORAL
Qty: 45 TABLET | Refills: 2 | Status: SHIPPED | OUTPATIENT
Start: 2021-10-29

## 2021-10-29 RX ORDER — PLECANATIDE 3 MG/1
1 TABLET ORAL DAILY
Qty: 30 TABLET | Refills: 2 | Status: SHIPPED | OUTPATIENT
Start: 2021-10-29 | End: 2022-01-24

## 2021-10-29 NOTE — PROGRESS NOTES
[address]  Dept: [phone]       Patient:  Paula Hancock  :      1985  MRN:      [id number]    Chief Complaint:  Patient presents with History Narrative      Not on file    Social Determinants of Health  Financial Resource Strain:       Difficulty of Paying Living Expenses: Not on file  Food Insecurity:       Worried About Running Out of Food in the Last Year: Not on file      Ran Out of Maternal Grandfather        Food Journal  · Reviewed and Discussed:       · Patient has a Food Journal?: yes   · Patient is reading nutrition labels? yes  · Average Caloric Intake:     · Average CHO Intake: 110  · Is patient exercising?  yes  · Type of exe organomegaly  Extremities: extremities normal, atraumatic, no cyanosis or edema  Pulses: 2+ and symmetric  Skin: Skin color, texture, turgor normal. No rashes or lesions  Neurologic: Grossly normal    ASSESSMENT       Encounter Diagnosis(ses):    Increased

## 2021-11-02 ENCOUNTER — OFFICE VISIT (OUTPATIENT)
Dept: FAMILY MEDICINE CLINIC | Facility: CLINIC | Age: 36
End: 2021-11-02
Payer: COMMERCIAL

## 2021-11-02 VITALS
HEIGHT: 64 IN | WEIGHT: 137 LBS | OXYGEN SATURATION: 99 % | TEMPERATURE: 99 F | DIASTOLIC BLOOD PRESSURE: 80 MMHG | BODY MASS INDEX: 23.39 KG/M2 | HEART RATE: 88 BPM | SYSTOLIC BLOOD PRESSURE: 120 MMHG

## 2021-11-02 DIAGNOSIS — J02.9 SORE THROAT: Primary | ICD-10-CM

## 2021-11-02 PROCEDURE — 87880 STREP A ASSAY W/OPTIC: CPT | Performed by: NURSE PRACTITIONER

## 2021-11-02 PROCEDURE — 3074F SYST BP LT 130 MM HG: CPT | Performed by: NURSE PRACTITIONER

## 2021-11-02 PROCEDURE — 3079F DIAST BP 80-89 MM HG: CPT | Performed by: NURSE PRACTITIONER

## 2021-11-02 PROCEDURE — 3008F BODY MASS INDEX DOCD: CPT | Performed by: NURSE PRACTITIONER

## 2021-11-02 PROCEDURE — 99213 OFFICE O/P EST LOW 20 MIN: CPT | Performed by: NURSE PRACTITIONER

## 2021-11-02 NOTE — PROGRESS NOTES
CHIEF COMPLAINT:   Patient presents with:  Sore Throat: Entered by patient        HPI:   Clarence Hanks is a 31year old female presents to clinic with complaint of sore throat for 1 day.  Reports history of seasonal allergies but this does not feel the same (from the past 24 hour(s))   STREP A ASSAY W/OPTIC    Collection Time: 11/02/21 12:53 PM   Result Value Ref Range    Strep Grp A Screen negative Negative    Control Line Present with a clear background (yes/no) yes Yes/No    Kit Lot # [id number] Numeric    Ki water  · An over-the-counter anesthetic gargle  Use medicine for more relief  Over-the-counter medicine can reduce sore throat symptoms. Ask your pharmacist if you have questions about which medicine to use.  To prevent possible medicine interactions, let t healthcare professional's instructions.

## 2022-01-07 ENCOUNTER — TELEPHONE (OUTPATIENT)
Dept: FAMILY MEDICINE CLINIC | Facility: CLINIC | Age: 37
End: 2022-01-07

## 2022-01-07 NOTE — TELEPHONE ENCOUNTER
----- Message from Marivel Stern RN sent at 1/7/2022  8:12 AM CST -----  Regarding: FW: Shoulder pain      ----- Message -----  From: Omar Schmidt  Sent: 1/7/2022   5:40 AM CST  To: Janet Rn Triage  Subject: Shoulder pain                                    I

## 2022-01-08 NOTE — TELEPHONE ENCOUNTER
Spoke with pt who states most of the symptoms have resolved except tingling at wrist.    Pt states her palms are also more yellow. Pt denies numbness, weakness. Pt declines IC at this time and will go if symptoms worsen.     Pt asking for appointment

## 2022-01-20 ENCOUNTER — OFFICE VISIT (OUTPATIENT)
Dept: FAMILY MEDICINE CLINIC | Facility: CLINIC | Age: 37
End: 2022-01-20
Payer: COMMERCIAL

## 2022-01-20 ENCOUNTER — EKG ENCOUNTER (OUTPATIENT)
Dept: LAB | Age: 37
End: 2022-01-20
Attending: FAMILY MEDICINE
Payer: COMMERCIAL

## 2022-01-20 ENCOUNTER — LAB ENCOUNTER (OUTPATIENT)
Dept: LAB | Age: 37
End: 2022-01-20
Attending: FAMILY MEDICINE
Payer: COMMERCIAL

## 2022-01-20 VITALS
BODY MASS INDEX: 23.45 KG/M2 | TEMPERATURE: 97 F | HEART RATE: 76 BPM | SYSTOLIC BLOOD PRESSURE: 125 MMHG | DIASTOLIC BLOOD PRESSURE: 89 MMHG | WEIGHT: 137.38 LBS | HEIGHT: 64 IN

## 2022-01-20 DIAGNOSIS — L29.9 ITCH: ICD-10-CM

## 2022-01-20 DIAGNOSIS — Z00.00 PHYSICAL EXAM: ICD-10-CM

## 2022-01-20 DIAGNOSIS — Z51.81 ENCOUNTER FOR THERAPEUTIC DRUG MONITORING: ICD-10-CM

## 2022-01-20 DIAGNOSIS — E66.3 OVERWEIGHT (BMI 25.0-29.9): ICD-10-CM

## 2022-01-20 DIAGNOSIS — L30.9 DERMATITIS: ICD-10-CM

## 2022-01-20 DIAGNOSIS — Z23 NEED FOR VACCINATION: ICD-10-CM

## 2022-01-20 DIAGNOSIS — R53.82 CHRONIC FATIGUE: ICD-10-CM

## 2022-01-20 DIAGNOSIS — Z00.00 PHYSICAL EXAM: Primary | ICD-10-CM

## 2022-01-20 LAB
ALBUMIN SERPL-MCNC: 4.1 G/DL (ref 3.4–5)
ALBUMIN/GLOB SERPL: 1.3 {RATIO} (ref 1–2)
ALP LIVER SERPL-CCNC: 34 U/L
ALT SERPL-CCNC: 23 U/L
ANION GAP SERPL CALC-SCNC: 5 MMOL/L (ref 0–18)
AST SERPL-CCNC: 13 U/L (ref 15–37)
BASOPHILS # BLD AUTO: 0.04 X10(3) UL (ref 0–0.2)
BASOPHILS NFR BLD AUTO: 0.7 %
BILIRUB SERPL-MCNC: 1 MG/DL (ref 0.1–2)
BILIRUB UR QL: NEGATIVE
BUN BLD-MCNC: 15 MG/DL (ref 7–18)
BUN/CREAT SERPL: 22.7 (ref 10–20)
CALCIUM BLD-MCNC: 9.7 MG/DL (ref 8.5–10.1)
CHLORIDE SERPL-SCNC: 105 MMOL/L (ref 98–112)
CHOLEST SERPL-MCNC: 168 MG/DL (ref ?–200)
CLARITY UR: CLEAR
CO2 SERPL-SCNC: 28 MMOL/L (ref 21–32)
COLOR UR: YELLOW
CREAT BLD-MCNC: 0.66 MG/DL
DEPRECATED RDW RBC AUTO: 40.6 FL (ref 35.1–46.3)
EOSINOPHIL # BLD AUTO: 0.15 X10(3) UL (ref 0–0.7)
EOSINOPHIL NFR BLD AUTO: 2.6 %
ERYTHROCYTE [DISTWIDTH] IN BLOOD BY AUTOMATED COUNT: 11.7 % (ref 11–15)
EST. AVERAGE GLUCOSE BLD GHB EST-MCNC: 105 MG/DL (ref 68–126)
FASTING PATIENT LIPID ANSWER: YES
FASTING STATUS PATIENT QL REPORTED: YES
GLOBULIN PLAS-MCNC: 3.1 G/DL (ref 2.8–4.4)
GLUCOSE BLD-MCNC: 96 MG/DL (ref 70–99)
GLUCOSE UR-MCNC: NEGATIVE MG/DL
HBA1C MFR BLD: 5.3 % (ref ?–5.7)
HCT VFR BLD AUTO: 42 %
HDLC SERPL-MCNC: 98 MG/DL (ref 40–59)
HGB BLD-MCNC: 13.7 G/DL
HGB UR QL STRIP.AUTO: NEGATIVE
IMM GRANULOCYTES # BLD AUTO: 0.01 X10(3) UL (ref 0–1)
IMM GRANULOCYTES NFR BLD: 0.2 %
KETONES UR-MCNC: NEGATIVE MG/DL
LDLC SERPL CALC-MCNC: 62 MG/DL (ref ?–100)
LEUKOCYTE ESTERASE UR QL STRIP.AUTO: NEGATIVE
LYMPHOCYTES # BLD AUTO: 2.74 X10(3) UL (ref 1–4)
LYMPHOCYTES NFR BLD AUTO: 47.7 %
MCH RBC QN AUTO: 31.1 PG (ref 26–34)
MCHC RBC AUTO-ENTMCNC: 32.6 G/DL (ref 31–37)
MCV RBC AUTO: 95.2 FL
MONOCYTES # BLD AUTO: 0.53 X10(3) UL (ref 0.1–1)
MONOCYTES NFR BLD AUTO: 9.2 %
NEUTROPHILS # BLD AUTO: 2.28 X10 (3) UL (ref 1.5–7.7)
NEUTROPHILS # BLD AUTO: 2.28 X10(3) UL (ref 1.5–7.7)
NEUTROPHILS NFR BLD AUTO: 39.6 %
NITRITE UR QL STRIP.AUTO: NEGATIVE
NONHDLC SERPL-MCNC: 70 MG/DL (ref ?–130)
OSMOLALITY SERPL CALC.SUM OF ELEC: 287 MOSM/KG (ref 275–295)
PH UR: 7 [PH] (ref 5–8)
PLATELET # BLD AUTO: 343 10(3)UL (ref 150–450)
POTASSIUM SERPL-SCNC: 5 MMOL/L (ref 3.5–5.1)
PROT SERPL-MCNC: 7.2 G/DL (ref 6.4–8.2)
PROT UR-MCNC: NEGATIVE MG/DL
RBC # BLD AUTO: 4.41 X10(6)UL
SODIUM SERPL-SCNC: 138 MMOL/L (ref 136–145)
SP GR UR STRIP: 1.01 (ref 1–1.03)
TRIGL SERPL-MCNC: 34 MG/DL (ref 30–149)
TSI SER-ACNC: 1.55 MIU/ML (ref 0.36–3.74)
UROBILINOGEN UR STRIP-ACNC: <2
VIT D+METAB SERPL-MCNC: 18.4 NG/ML (ref 30–100)
VLDLC SERPL CALC-MCNC: 5 MG/DL (ref 0–30)
WBC # BLD AUTO: 5.8 X10(3) UL (ref 4–11)

## 2022-01-20 PROCEDURE — 3074F SYST BP LT 130 MM HG: CPT | Performed by: FAMILY MEDICINE

## 2022-01-20 PROCEDURE — 82306 VITAMIN D 25 HYDROXY: CPT

## 2022-01-20 PROCEDURE — 82785 ASSAY OF IGE: CPT

## 2022-01-20 PROCEDURE — 93005 ELECTROCARDIOGRAM TRACING: CPT

## 2022-01-20 PROCEDURE — 3008F BODY MASS INDEX DOCD: CPT | Performed by: FAMILY MEDICINE

## 2022-01-20 PROCEDURE — 80053 COMPREHEN METABOLIC PANEL: CPT

## 2022-01-20 PROCEDURE — 81003 URINALYSIS AUTO W/O SCOPE: CPT

## 2022-01-20 PROCEDURE — 86003 ALLG SPEC IGE CRUDE XTRC EA: CPT

## 2022-01-20 PROCEDURE — 93010 ELECTROCARDIOGRAM REPORT: CPT | Performed by: FAMILY MEDICINE

## 2022-01-20 PROCEDURE — 90686 IIV4 VACC NO PRSV 0.5 ML IM: CPT | Performed by: FAMILY MEDICINE

## 2022-01-20 PROCEDURE — 85025 COMPLETE CBC W/AUTO DIFF WBC: CPT

## 2022-01-20 PROCEDURE — 84443 ASSAY THYROID STIM HORMONE: CPT

## 2022-01-20 PROCEDURE — 99395 PREV VISIT EST AGE 18-39: CPT | Performed by: FAMILY MEDICINE

## 2022-01-20 PROCEDURE — 36415 COLL VENOUS BLD VENIPUNCTURE: CPT

## 2022-01-20 PROCEDURE — 3079F DIAST BP 80-89 MM HG: CPT | Performed by: FAMILY MEDICINE

## 2022-01-20 PROCEDURE — 80061 LIPID PANEL: CPT

## 2022-01-20 PROCEDURE — 83789 MASS SPECTROMETRY QUAL/QUAN: CPT

## 2022-01-20 PROCEDURE — 90471 IMMUNIZATION ADMIN: CPT | Performed by: FAMILY MEDICINE

## 2022-01-20 PROCEDURE — 83036 HEMOGLOBIN GLYCOSYLATED A1C: CPT

## 2022-01-20 RX ORDER — ERGOCALCIFEROL 1.25 MG/1
50000 CAPSULE ORAL WEEKLY
Qty: 12 CAPSULE | Refills: 4 | Status: SHIPPED | OUTPATIENT
Start: 2022-01-20 | End: 2022-02-19

## 2022-01-20 RX ORDER — MOMETASONE FUROATE 1 MG/G
CREAM TOPICAL
Qty: 45 G | Refills: 1 | Status: SHIPPED | OUTPATIENT
Start: 2022-01-20

## 2022-01-20 NOTE — PROGRESS NOTES
1/20/2022  9:27 AM    Aline Siddiqui is a 31year old female. Chief complaint(s): Patient presents with:  Physical    HPI:     Aline Siddiqui primary complaint is regarding CPE.      Aline Siddiqui is a 31year old female present today for a routine periodic heal occ    Drug use: No       Immunizations:     Immunization History  Administered            Date(s) Administered    >=3 YRS FLUZONE OR FLUARIX QUAD PRESERVE FREE SINGLE DOSE (28331) FLU CLINIC                          09/01/2015      >=3 YRS QUAD MULTIDOSE Head: Normocephalic.       Right Ear: Hearing, tympanic membrane, ear canal and external ear normal.      Left Ear: Hearing, tympanic membrane, ear canal and external ear normal.      Nose: Nose normal.      Mouth/Throat:      Mouth: Mucous membranes are mo Platelet      Comp Metabolic Panel (14)      Hemoglobin A1C      Lipid Panel      TSH W Reflex To Free T4      Vitamin D      Urinalysis with Culture Reflex      Bile Acids, Fractionated and Total by LC-MS/MS [E]      Allergy Adult Foof Profile, Reflex Reflex      Allergen [employer]. Reg.  Prof      Meds This Visit:  Requested Prescriptions     Signed Prescriptions Disp Refills   • Mometasone Furoate 0.1 % External Cream 45 g 1     Sig: Apply to affected area(s) BID       Imaging & Referrals:  EKG 12-ANDRESSA

## 2022-01-23 DIAGNOSIS — K59.00 CONSTIPATION, UNSPECIFIED CONSTIPATION TYPE: ICD-10-CM

## 2022-01-23 LAB
BILE ACIDS - CHENODEOXYCHOLIC ACID: 0.3 UMOL/L
BILE ACIDS - CHOLIC ACID: 0.3 UMOL/L
BILE ACIDS - DEOXYCHOLIC ACID: 0.3 UMOL/L
BILE ACIDS - URSODEOXYCHOLIC ACID: 0.3 UMOL/L
TOTAL BILE ACIDS: 1.2 UMOL/L

## 2022-01-24 LAB
A ALTERNATA IGE QN: <0.1 KUA/L (ref ?–0.1)
C HERBARUM IGE QN: <0.1 KUA/L (ref ?–0.1)
CAT DANDER IGE QN: <0.1 KUA/L (ref ?–0.1)
CLAM IGE QN: <0.1 KUA/L (ref ?–0.1)
CODFISH IGE QN: <0.1 KUA/L (ref ?–0.1)
COMMON RAGWEED IGE QN: 26.6 KUA/L (ref ?–0.1)
CORN IGE QN: <0.1 KUA/L (ref ?–0.1)
COW MILK IGE QN: <0.1 KUA/L (ref ?–0.1)
D FARINAE IGE QN: <0.1 KUA/L (ref ?–0.1)
DOG DANDER IGE QN: <0.1 KUA/L (ref ?–0.1)
EGG WHITE IGE QN: <0.1 KUA/L (ref ?–0.1)
GOOSEFOOT IGE QN: <0.1 KUA/L (ref ?–0.1)
HOUSE DUST HS IGE QN: <0.1 KUA/L (ref ?–0.1)
IGE SERPL-ACNC: 43.4 KU/L (ref 2–214)
IGE SERPL-ACNC: 43.4 KU/L (ref 2–214)
KENT BLUE GRASS IGE QN: <0.1 KUA/L (ref ?–0.1)
PEANUT IGE QN: <0.1 KUA/L (ref ?–0.1)
PER RYE GRASS IGE QN: <0.1 KUA/L (ref ?–0.1)
SCALLOP IGE QN: <0.1 KUA/L (ref ?–0.1)
SESAME SEED IGE QN: <0.1 KUA/L (ref ?–0.1)
SHRIMP IGE QN: <0.1 KUA/L (ref ?–0.1)
SOYBEAN IGE QN: <0.1 KUA/L (ref ?–0.1)
WALNUT IGE QN: <0.1 KUA/L (ref ?–0.1)
WHEAT IGE QN: <0.1 KUA/L (ref ?–0.1)
WHITE ELM IGE QN: <0.1 KUA/L (ref ?–0.1)
WHITE OAK IGE QN: <0.1 KUA/L (ref ?–0.1)

## 2022-01-24 RX ORDER — PLECANATIDE 3 MG/1
TABLET ORAL
Qty: 30 TABLET | Refills: 0 | Status: SHIPPED | OUTPATIENT
Start: 2022-01-24

## 2022-02-09 ENCOUNTER — OFFICE VISIT (OUTPATIENT)
Dept: SURGERY | Facility: CLINIC | Age: 37
End: 2022-02-09
Payer: COMMERCIAL

## 2022-02-09 VITALS
OXYGEN SATURATION: 100 % | BODY MASS INDEX: 24.04 KG/M2 | SYSTOLIC BLOOD PRESSURE: 118 MMHG | DIASTOLIC BLOOD PRESSURE: 82 MMHG | HEART RATE: 86 BPM | WEIGHT: 140.81 LBS | HEIGHT: 64 IN

## 2022-02-09 DIAGNOSIS — E66.3 OVERWEIGHT (BMI 25.0-29.9): ICD-10-CM

## 2022-02-09 DIAGNOSIS — R63.2 INCREASED APPETITE: Primary | ICD-10-CM

## 2022-02-09 DIAGNOSIS — Z51.81 ENCOUNTER FOR THERAPEUTIC DRUG MONITORING: ICD-10-CM

## 2022-02-09 PROCEDURE — 3079F DIAST BP 80-89 MM HG: CPT | Performed by: INTERNAL MEDICINE

## 2022-02-09 PROCEDURE — 3008F BODY MASS INDEX DOCD: CPT | Performed by: INTERNAL MEDICINE

## 2022-02-09 PROCEDURE — 99214 OFFICE O/P EST MOD 30 MIN: CPT | Performed by: INTERNAL MEDICINE

## 2022-02-09 PROCEDURE — 3074F SYST BP LT 130 MM HG: CPT | Performed by: INTERNAL MEDICINE

## 2022-02-09 RX ORDER — PHENTERMINE HYDROCHLORIDE 37.5 MG/1
TABLET ORAL
Qty: 45 TABLET | Refills: 2 | Status: SHIPPED | OUTPATIENT
Start: 2022-02-09

## 2022-05-16 ENCOUNTER — OFFICE VISIT (OUTPATIENT)
Dept: SURGERY | Facility: CLINIC | Age: 37
End: 2022-05-16
Payer: COMMERCIAL

## 2022-05-16 VITALS
HEART RATE: 77 BPM | OXYGEN SATURATION: 99 % | WEIGHT: 148 LBS | BODY MASS INDEX: 25.27 KG/M2 | HEIGHT: 64 IN | DIASTOLIC BLOOD PRESSURE: 92 MMHG | SYSTOLIC BLOOD PRESSURE: 128 MMHG

## 2022-05-16 DIAGNOSIS — K59.00 CONSTIPATION, UNSPECIFIED CONSTIPATION TYPE: ICD-10-CM

## 2022-05-16 DIAGNOSIS — E66.3 OVERWEIGHT (BMI 25.0-29.9): ICD-10-CM

## 2022-05-16 DIAGNOSIS — R63.2 INCREASED APPETITE: Primary | ICD-10-CM

## 2022-05-16 DIAGNOSIS — Z51.81 ENCOUNTER FOR THERAPEUTIC DRUG MONITORING: ICD-10-CM

## 2022-05-16 PROCEDURE — 3008F BODY MASS INDEX DOCD: CPT | Performed by: INTERNAL MEDICINE

## 2022-05-16 PROCEDURE — 3080F DIAST BP >= 90 MM HG: CPT | Performed by: INTERNAL MEDICINE

## 2022-05-16 PROCEDURE — 3074F SYST BP LT 130 MM HG: CPT | Performed by: INTERNAL MEDICINE

## 2022-05-16 PROCEDURE — 99214 OFFICE O/P EST MOD 30 MIN: CPT | Performed by: INTERNAL MEDICINE

## 2022-05-16 RX ORDER — PHENTERMINE HYDROCHLORIDE 37.5 MG/1
TABLET ORAL
Qty: 45 TABLET | Refills: 2 | Status: SHIPPED | OUTPATIENT
Start: 2022-05-16

## 2022-05-16 RX ORDER — TOPIRAMATE 25 MG/1
25 TABLET ORAL EVERY EVENING
Qty: 30 TABLET | Refills: 2 | Status: SHIPPED | OUTPATIENT
Start: 2022-05-16

## 2022-05-16 RX ORDER — PLECANATIDE 3 MG/1
1 TABLET ORAL DAILY
Qty: 90 TABLET | Refills: 1 | Status: SHIPPED | OUTPATIENT
Start: 2022-05-16 | End: 2022-08-14

## 2022-05-31 ENCOUNTER — HOSPITAL ENCOUNTER (OUTPATIENT)
Age: 37
Discharge: HOME | End: 2022-05-31
Payer: COMMERCIAL

## 2022-05-31 VITALS
SYSTOLIC BLOOD PRESSURE: 125 MMHG | HEART RATE: 98 BPM | TEMPERATURE: 99 F | DIASTOLIC BLOOD PRESSURE: 88 MMHG | RESPIRATION RATE: 18 BRPM | OXYGEN SATURATION: 100 %

## 2022-05-31 DIAGNOSIS — R09.82 POST-NASAL DRIP: ICD-10-CM

## 2022-05-31 DIAGNOSIS — J06.9 UPPER RESPIRATORY TRACT INFECTION, UNSPECIFIED TYPE: Primary | ICD-10-CM

## 2022-05-31 DIAGNOSIS — R09.81 NASAL CONGESTION: ICD-10-CM

## 2022-05-31 PROCEDURE — 99212 OFFICE O/P EST SF 10 MIN: CPT

## 2022-06-01 ENCOUNTER — TELEPHONE (OUTPATIENT)
Dept: FAMILY MEDICINE CLINIC | Facility: CLINIC | Age: 37
End: 2022-06-01

## 2022-06-01 ENCOUNTER — OFFICE VISIT (OUTPATIENT)
Dept: FAMILY MEDICINE CLINIC | Facility: CLINIC | Age: 37
End: 2022-06-01
Payer: COMMERCIAL

## 2022-06-01 ENCOUNTER — NURSE TRIAGE (OUTPATIENT)
Dept: FAMILY MEDICINE CLINIC | Facility: CLINIC | Age: 37
End: 2022-06-01

## 2022-06-01 VITALS
WEIGHT: 147 LBS | HEART RATE: 73 BPM | DIASTOLIC BLOOD PRESSURE: 87 MMHG | HEIGHT: 64 IN | SYSTOLIC BLOOD PRESSURE: 121 MMHG | BODY MASS INDEX: 25.1 KG/M2

## 2022-06-01 DIAGNOSIS — J01.10 ACUTE NON-RECURRENT FRONTAL SINUSITIS: Primary | ICD-10-CM

## 2022-06-01 PROCEDURE — 3008F BODY MASS INDEX DOCD: CPT | Performed by: PHYSICIAN ASSISTANT

## 2022-06-01 PROCEDURE — 99213 OFFICE O/P EST LOW 20 MIN: CPT | Performed by: PHYSICIAN ASSISTANT

## 2022-06-01 PROCEDURE — 3079F DIAST BP 80-89 MM HG: CPT | Performed by: PHYSICIAN ASSISTANT

## 2022-06-01 PROCEDURE — 3074F SYST BP LT 130 MM HG: CPT | Performed by: PHYSICIAN ASSISTANT

## 2022-06-01 RX ORDER — AMOXICILLIN 875 MG/1
875 TABLET, COATED ORAL 2 TIMES DAILY
Qty: 20 TABLET | Refills: 0 | Status: SHIPPED | OUTPATIENT
Start: 2022-06-01 | End: 2022-06-11

## 2022-06-01 RX ORDER — ERGOCALCIFEROL 1.25 MG/1
50000 CAPSULE ORAL WEEKLY
COMMUNITY
Start: 2022-04-07

## 2022-06-01 NOTE — TELEPHONE ENCOUNTER
Appointment made with Arline.     Future Appointments   Date Time Provider Demond Cervantes   6/1/2022 10:45 AM Joon Moon PA-C [facility] [place]   8/24/2022  9:45 AM Ella Flores MD [address] [facility]

## 2022-06-01 NOTE — TELEPHONE ENCOUNTER
Patient stated that she went to urgent care yesterday. Has been having sinus pressure, post nasal drip, cough and congestion for 1 week. Patient is losing her voice. Took home COVID tests and they were negative. Coughing up green phlegm and green mucus from the nose. Temperature last night was 99.8F and this morning 99.7F to 99.8F. Patient disappointed because went to urgent care and they only listened to her chest. Did not take her temperature or look at her sinus. Did not prescribe anything. Patient thinks she has a sinus infection and wanted to know if Dr Thiago Adler could prescribe an antibiotic? Please advise. No appointments available today with Dr Thiago Adler.

## 2022-06-01 NOTE — TELEPHONE ENCOUNTER
Patient called stated [place] never received medication script    Called [place] spoke with [facility] pharmacist verbal order given

## 2022-08-24 ENCOUNTER — OFFICE VISIT (OUTPATIENT)
Dept: SURGERY | Facility: CLINIC | Age: 37
End: 2022-08-24
Payer: COMMERCIAL

## 2022-08-24 VITALS
OXYGEN SATURATION: 99 % | HEIGHT: 64 IN | HEART RATE: 80 BPM | WEIGHT: 148.5 LBS | BODY MASS INDEX: 25.35 KG/M2 | DIASTOLIC BLOOD PRESSURE: 88 MMHG | SYSTOLIC BLOOD PRESSURE: 119 MMHG

## 2022-08-24 DIAGNOSIS — E66.3 OVERWEIGHT (BMI 25.0-29.9): ICD-10-CM

## 2022-08-24 DIAGNOSIS — R63.2 INCREASED APPETITE: Primary | ICD-10-CM

## 2022-08-24 DIAGNOSIS — R63.2 BINGE EATING: ICD-10-CM

## 2022-08-24 DIAGNOSIS — Z51.81 ENCOUNTER FOR THERAPEUTIC DRUG MONITORING: ICD-10-CM

## 2022-08-24 PROCEDURE — 99214 OFFICE O/P EST MOD 30 MIN: CPT | Performed by: INTERNAL MEDICINE

## 2022-08-24 PROCEDURE — 3008F BODY MASS INDEX DOCD: CPT | Performed by: INTERNAL MEDICINE

## 2022-08-24 PROCEDURE — 3079F DIAST BP 80-89 MM HG: CPT | Performed by: INTERNAL MEDICINE

## 2022-08-24 PROCEDURE — 3074F SYST BP LT 130 MM HG: CPT | Performed by: INTERNAL MEDICINE

## 2022-09-19 ENCOUNTER — TELEPHONE (OUTPATIENT)
Dept: SURGERY | Facility: CLINIC | Age: 37
End: 2022-09-19

## 2022-09-20 ENCOUNTER — TELEPHONE (OUTPATIENT)
Dept: FAMILY MEDICINE CLINIC | Facility: CLINIC | Age: 37
End: 2022-09-20

## 2022-09-20 DIAGNOSIS — E55.9 VITAMIN D DEFICIENCY: Primary | ICD-10-CM

## 2022-09-20 NOTE — TELEPHONE ENCOUNTER
Patient calling to ask if vitamin D blood orders can be placed to recheck levels as requested by doctor, or if the patient would need an another appointment.

## 2022-09-30 ENCOUNTER — LAB ENCOUNTER (OUTPATIENT)
Dept: LAB | Facility: HOSPITAL | Age: 37
End: 2022-09-30
Attending: PHYSICIAN ASSISTANT
Payer: COMMERCIAL

## 2022-09-30 DIAGNOSIS — E55.9 VITAMIN D DEFICIENCY: ICD-10-CM

## 2022-09-30 LAB — VIT D+METAB SERPL-MCNC: 37.8 NG/ML (ref 30–100)

## 2022-09-30 PROCEDURE — 36415 COLL VENOUS BLD VENIPUNCTURE: CPT

## 2022-09-30 PROCEDURE — 82306 VITAMIN D 25 HYDROXY: CPT

## 2022-10-12 ENCOUNTER — OFFICE VISIT (OUTPATIENT)
Dept: SURGERY | Facility: CLINIC | Age: 37
End: 2022-10-12
Payer: COMMERCIAL

## 2022-10-12 VITALS — BODY MASS INDEX: 25.75 KG/M2 | HEIGHT: 64 IN | WEIGHT: 150.81 LBS

## 2022-10-12 DIAGNOSIS — R63.5 WEIGHT GAIN: ICD-10-CM

## 2022-10-12 DIAGNOSIS — E66.3 OVERWEIGHT (BMI 25.0-29.9): ICD-10-CM

## 2022-10-12 DIAGNOSIS — K59.00 CONSTIPATION, UNSPECIFIED CONSTIPATION TYPE: ICD-10-CM

## 2022-10-12 PROCEDURE — 97802 MEDICAL NUTRITION INDIV IN: CPT

## 2022-10-12 PROCEDURE — 3008F BODY MASS INDEX DOCD: CPT

## 2022-10-12 NOTE — PATIENT INSTRUCTIONS
Recommendations/Status   1. Keep a food record, My Net Diary, select macros dashboard. 2.  Consume 4-6 meals/snacks per day. Include protein and produce. Aim for 55-65 grams of protein per day. Try to keep carbohydrates at 120 grams per day or less. 3.  Continue to drink at least 64 oz of water per day. 4.  Continue to do cardio at least 30 minutes per day. 5.  Continue to do strength training at least 10 minutes 3 days per week.

## 2022-10-31 ENCOUNTER — HOSPITAL ENCOUNTER (OUTPATIENT)
Age: 37
Discharge: HOME | End: 2022-10-31
Payer: COMMERCIAL

## 2022-10-31 VITALS
HEART RATE: 74 BPM | RESPIRATION RATE: 18 BRPM | SYSTOLIC BLOOD PRESSURE: 149 MMHG | TEMPERATURE: 97 F | DIASTOLIC BLOOD PRESSURE: 80 MMHG | OXYGEN SATURATION: 100 %

## 2022-10-31 DIAGNOSIS — R09.82 POST-NASAL DRIP: ICD-10-CM

## 2022-10-31 DIAGNOSIS — R05.1 ACUTE COUGH: Primary | ICD-10-CM

## 2022-10-31 LAB
POCT INFLUENZA A: NEGATIVE
POCT INFLUENZA B: NEGATIVE

## 2022-10-31 PROCEDURE — 99213 OFFICE O/P EST LOW 20 MIN: CPT | Performed by: NURSE PRACTITIONER

## 2022-10-31 PROCEDURE — 87502 INFLUENZA DNA AMP PROBE: CPT | Performed by: NURSE PRACTITIONER

## 2022-10-31 NOTE — DISCHARGE INSTRUCTIONS
Recommend Flonase and Zyrtec to help with cough congestion runny nose and postnasal drip. Take ibuprofen or Tylenol for fever comfort or pain. Drink plenty of fluids warm tea with honey. You may try over-the-counter Chloraseptic spray for any throat discomfort.

## 2022-11-29 ENCOUNTER — OFFICE VISIT (OUTPATIENT)
Dept: SURGERY | Facility: CLINIC | Age: 37
End: 2022-11-29
Payer: COMMERCIAL

## 2022-11-29 ENCOUNTER — TELEPHONE (OUTPATIENT)
Dept: SURGERY | Facility: CLINIC | Age: 37
End: 2022-11-29

## 2022-11-29 VITALS
SYSTOLIC BLOOD PRESSURE: 122 MMHG | OXYGEN SATURATION: 100 % | BODY MASS INDEX: 25.61 KG/M2 | DIASTOLIC BLOOD PRESSURE: 84 MMHG | HEIGHT: 64 IN | WEIGHT: 150 LBS | HEART RATE: 69 BPM

## 2022-11-29 DIAGNOSIS — Z51.81 ENCOUNTER FOR THERAPEUTIC DRUG MONITORING: ICD-10-CM

## 2022-11-29 DIAGNOSIS — E66.3 OVERWEIGHT (BMI 25.0-29.9): ICD-10-CM

## 2022-11-29 DIAGNOSIS — R63.2 BINGE EATING: Primary | ICD-10-CM

## 2022-11-29 PROCEDURE — 99214 OFFICE O/P EST MOD 30 MIN: CPT | Performed by: INTERNAL MEDICINE

## 2022-11-29 PROCEDURE — 3008F BODY MASS INDEX DOCD: CPT | Performed by: INTERNAL MEDICINE

## 2022-11-29 PROCEDURE — 3074F SYST BP LT 130 MM HG: CPT | Performed by: INTERNAL MEDICINE

## 2022-11-29 PROCEDURE — 3079F DIAST BP 80-89 MM HG: CPT | Performed by: INTERNAL MEDICINE

## 2022-11-29 RX ORDER — TOPIRAMATE 25 MG/1
25 TABLET ORAL EVERY EVENING
Qty: 30 TABLET | Refills: 2 | Status: SHIPPED | OUTPATIENT
Start: 2022-11-29

## 2022-12-20 DIAGNOSIS — E66.3 OVERWEIGHT (BMI 25.0-29.9): ICD-10-CM

## 2022-12-20 RX ORDER — PHENTERMINE HYDROCHLORIDE 37.5 MG/1
37.5 TABLET ORAL
Qty: 30 TABLET | Refills: 2 | Status: SHIPPED | OUTPATIENT
Start: 2022-12-20 | End: 2023-01-19

## 2023-03-21 NOTE — ED INITIAL ASSESSMENT (HPI)
Presents with 5 days of congestion and cough. Frontal headache with facial pressure. No fever. Son was covid + 6 weeks ago. Multiple negative home covid tests.
never true

## 2023-04-12 ENCOUNTER — OFFICE VISIT (OUTPATIENT)
Dept: FAMILY MEDICINE CLINIC | Facility: CLINIC | Age: 38
End: 2023-04-12

## 2023-04-12 VITALS
WEIGHT: 157.38 LBS | HEART RATE: 84 BPM | HEIGHT: 64 IN | SYSTOLIC BLOOD PRESSURE: 136 MMHG | BODY MASS INDEX: 26.87 KG/M2 | DIASTOLIC BLOOD PRESSURE: 88 MMHG

## 2023-04-12 DIAGNOSIS — Z00.00 PHYSICAL EXAM: Primary | ICD-10-CM

## 2023-04-12 PROCEDURE — 3075F SYST BP GE 130 - 139MM HG: CPT | Performed by: FAMILY MEDICINE

## 2023-04-12 PROCEDURE — 3008F BODY MASS INDEX DOCD: CPT | Performed by: FAMILY MEDICINE

## 2023-04-12 PROCEDURE — 99395 PREV VISIT EST AGE 18-39: CPT | Performed by: FAMILY MEDICINE

## 2023-04-12 PROCEDURE — 3079F DIAST BP 80-89 MM HG: CPT | Performed by: FAMILY MEDICINE

## 2023-04-13 ENCOUNTER — LAB ENCOUNTER (OUTPATIENT)
Dept: LAB | Facility: HOSPITAL | Age: 38
End: 2023-04-13
Attending: FAMILY MEDICINE
Payer: COMMERCIAL

## 2023-04-13 ENCOUNTER — TELEPHONE (OUTPATIENT)
Dept: FAMILY MEDICINE CLINIC | Facility: CLINIC | Age: 38
End: 2023-04-13

## 2023-04-13 DIAGNOSIS — Z00.00 PHYSICAL EXAM: ICD-10-CM

## 2023-04-13 LAB
ALBUMIN SERPL-MCNC: 3.5 G/DL (ref 3.4–5)
ALBUMIN/GLOB SERPL: 1 {RATIO} (ref 1–2)
ALP LIVER SERPL-CCNC: 34 U/L
ALT SERPL-CCNC: 20 U/L
ANION GAP SERPL CALC-SCNC: 8 MMOL/L (ref 0–18)
AST SERPL-CCNC: 12 U/L (ref 15–37)
ATRIAL RATE: 73 BPM
BASOPHILS # BLD AUTO: 0.04 X10(3) UL (ref 0–0.2)
BASOPHILS NFR BLD AUTO: 0.6 %
BILIRUB SERPL-MCNC: 0.4 MG/DL (ref 0.1–2)
BILIRUB UR QL: NEGATIVE
BUN BLD-MCNC: 15 MG/DL (ref 7–18)
BUN/CREAT SERPL: 21.4 (ref 10–20)
CALCIUM BLD-MCNC: 9 MG/DL (ref 8.5–10.1)
CHLORIDE SERPL-SCNC: 108 MMOL/L (ref 98–112)
CHOLEST SERPL-MCNC: 169 MG/DL (ref ?–200)
CLARITY UR: CLEAR
CO2 SERPL-SCNC: 23 MMOL/L (ref 21–32)
CREAT BLD-MCNC: 0.7 MG/DL
DEPRECATED RDW RBC AUTO: 39 FL (ref 35.1–46.3)
EOSINOPHIL # BLD AUTO: 0.64 X10(3) UL (ref 0–0.7)
EOSINOPHIL NFR BLD AUTO: 9.7 %
ERYTHROCYTE [DISTWIDTH] IN BLOOD BY AUTOMATED COUNT: 11.8 % (ref 11–15)
EST. AVERAGE GLUCOSE BLD GHB EST-MCNC: 105 MG/DL (ref 68–126)
FASTING PATIENT LIPID ANSWER: YES
FASTING STATUS PATIENT QL REPORTED: YES
GFR SERPLBLD BASED ON 1.73 SQ M-ARVRAT: 116 ML/MIN/1.73M2 (ref 60–?)
GLOBULIN PLAS-MCNC: 3.4 G/DL (ref 2.8–4.4)
GLUCOSE BLD-MCNC: 98 MG/DL (ref 70–99)
GLUCOSE UR-MCNC: NORMAL MG/DL
HBA1C MFR BLD: 5.3 % (ref ?–5.7)
HCT VFR BLD AUTO: 40 %
HDLC SERPL-MCNC: 84 MG/DL (ref 40–59)
HGB BLD-MCNC: 13.6 G/DL
HGB UR QL STRIP.AUTO: NEGATIVE
IMM GRANULOCYTES # BLD AUTO: 0.02 X10(3) UL (ref 0–1)
IMM GRANULOCYTES NFR BLD: 0.3 %
KETONES UR-MCNC: NEGATIVE MG/DL
LDLC SERPL CALC-MCNC: 74 MG/DL (ref ?–100)
LEUKOCYTE ESTERASE UR QL STRIP.AUTO: NEGATIVE
LYMPHOCYTES # BLD AUTO: 2.85 X10(3) UL (ref 1–4)
LYMPHOCYTES NFR BLD AUTO: 43 %
MCH RBC QN AUTO: 30.6 PG (ref 26–34)
MCHC RBC AUTO-ENTMCNC: 34 G/DL (ref 31–37)
MCV RBC AUTO: 90.1 FL
MONOCYTES # BLD AUTO: 0.38 X10(3) UL (ref 0.1–1)
MONOCYTES NFR BLD AUTO: 5.7 %
NEUTROPHILS # BLD AUTO: 2.7 X10 (3) UL (ref 1.5–7.7)
NEUTROPHILS # BLD AUTO: 2.7 X10(3) UL (ref 1.5–7.7)
NEUTROPHILS NFR BLD AUTO: 40.7 %
NITRITE UR QL STRIP.AUTO: NEGATIVE
NONHDLC SERPL-MCNC: 85 MG/DL (ref ?–130)
OSMOLALITY SERPL CALC.SUM OF ELEC: 289 MOSM/KG (ref 275–295)
P AXIS: 45 DEGREES
P-R INTERVAL: 128 MS
PH UR: 6 [PH] (ref 5–8)
PLATELET # BLD AUTO: 298 10(3)UL (ref 150–450)
POTASSIUM SERPL-SCNC: 4.1 MMOL/L (ref 3.5–5.1)
PROT SERPL-MCNC: 6.9 G/DL (ref 6.4–8.2)
PROT UR-MCNC: NEGATIVE MG/DL
Q-T INTERVAL: 364 MS
QRS DURATION: 74 MS
QTC CALCULATION (BEZET): 401 MS
R AXIS: 54 DEGREES
RBC # BLD AUTO: 4.44 X10(6)UL
SODIUM SERPL-SCNC: 139 MMOL/L (ref 136–145)
SP GR UR STRIP: 1.02 (ref 1–1.03)
T AXIS: -14 DEGREES
TRIGL SERPL-MCNC: 55 MG/DL (ref 30–149)
TSI SER-ACNC: 2.37 MIU/ML (ref 0.36–3.74)
UROBILINOGEN UR STRIP-ACNC: NORMAL
VENTRICULAR RATE: 73 BPM
VIT D+METAB SERPL-MCNC: 37.4 NG/ML (ref 30–100)
VLDLC SERPL CALC-MCNC: 8 MG/DL (ref 0–30)
WBC # BLD AUTO: 6.6 X10(3) UL (ref 4–11)

## 2023-04-13 PROCEDURE — 80061 LIPID PANEL: CPT

## 2023-04-13 PROCEDURE — 83036 HEMOGLOBIN GLYCOSYLATED A1C: CPT

## 2023-04-13 PROCEDURE — 93010 ELECTROCARDIOGRAM REPORT: CPT | Performed by: INTERNAL MEDICINE

## 2023-04-13 PROCEDURE — 93005 ELECTROCARDIOGRAM TRACING: CPT

## 2023-04-13 PROCEDURE — 36415 COLL VENOUS BLD VENIPUNCTURE: CPT

## 2023-04-13 PROCEDURE — 84443 ASSAY THYROID STIM HORMONE: CPT

## 2023-04-13 PROCEDURE — 82306 VITAMIN D 25 HYDROXY: CPT

## 2023-04-13 PROCEDURE — 80053 COMPREHEN METABOLIC PANEL: CPT

## 2023-04-13 PROCEDURE — 85025 COMPLETE CBC W/AUTO DIFF WBC: CPT

## 2023-04-14 NOTE — TELEPHONE ENCOUNTER
Ptanya called about EKG. Concerned about abnormalities. I made her aware that comparison was requested. She is not currently experiencing any chest symptoms. Advised her to monitor for any chest pain, palpitations, shortness of breath and report to ER if they occur. She will be notified once the interpretation is complete. She verbalized understanding and compliance.

## 2023-04-17 ENCOUNTER — APPOINTMENT (OUTPATIENT)
Dept: GENERAL RADIOLOGY | Facility: HOSPITAL | Age: 38
End: 2023-04-17
Attending: EMERGENCY MEDICINE
Payer: COMMERCIAL

## 2023-04-17 ENCOUNTER — PATIENT MESSAGE (OUTPATIENT)
Dept: FAMILY MEDICINE CLINIC | Facility: CLINIC | Age: 38
End: 2023-04-17

## 2023-04-17 ENCOUNTER — HOSPITAL ENCOUNTER (EMERGENCY)
Facility: HOSPITAL | Age: 38
Discharge: HOME | End: 2023-04-17
Attending: EMERGENCY MEDICINE
Payer: COMMERCIAL

## 2023-04-17 VITALS
HEART RATE: 73 BPM | SYSTOLIC BLOOD PRESSURE: 136 MMHG | BODY MASS INDEX: 26.46 KG/M2 | DIASTOLIC BLOOD PRESSURE: 107 MMHG | TEMPERATURE: 98 F | RESPIRATION RATE: 27 BRPM | OXYGEN SATURATION: 98 % | HEIGHT: 64 IN | WEIGHT: 155 LBS

## 2023-04-17 DIAGNOSIS — R07.9 CHEST PAIN OF UNCERTAIN ETIOLOGY: Primary | ICD-10-CM

## 2023-04-17 DIAGNOSIS — R03.0 ELEVATED BLOOD PRESSURE READING: ICD-10-CM

## 2023-04-17 LAB
ANION GAP SERPL CALC-SCNC: 4 MMOL/L (ref 0–18)
ATRIAL RATE: 79 BPM
BASOPHILS # BLD AUTO: 0.04 X10(3) UL (ref 0–0.2)
BASOPHILS NFR BLD AUTO: 0.5 %
BUN BLD-MCNC: 18 MG/DL (ref 7–18)
BUN/CREAT SERPL: 22.5 (ref 10–20)
CALCIUM BLD-MCNC: 9.1 MG/DL (ref 8.5–10.1)
CHLORIDE SERPL-SCNC: 108 MMOL/L (ref 98–112)
CO2 SERPL-SCNC: 24 MMOL/L (ref 21–32)
CREAT BLD-MCNC: 0.8 MG/DL
D DIMER PPP FEU-MCNC: <0.27 UG/ML FEU (ref ?–0.5)
DEPRECATED RDW RBC AUTO: 38.7 FL (ref 35.1–46.3)
EOSINOPHIL # BLD AUTO: 0.24 X10(3) UL (ref 0–0.7)
EOSINOPHIL NFR BLD AUTO: 2.8 %
ERYTHROCYTE [DISTWIDTH] IN BLOOD BY AUTOMATED COUNT: 11.8 % (ref 11–15)
GFR SERPLBLD BASED ON 1.73 SQ M-ARVRAT: 98 ML/MIN/1.73M2 (ref 60–?)
GLUCOSE BLD-MCNC: 148 MG/DL (ref 70–99)
HCT VFR BLD AUTO: 39.7 %
HGB BLD-MCNC: 13.1 G/DL
IMM GRANULOCYTES # BLD AUTO: 0.03 X10(3) UL (ref 0–1)
IMM GRANULOCYTES NFR BLD: 0.4 %
LYMPHOCYTES # BLD AUTO: 2.73 X10(3) UL (ref 1–4)
LYMPHOCYTES NFR BLD AUTO: 32.3 %
MCH RBC QN AUTO: 29.7 PG (ref 26–34)
MCHC RBC AUTO-ENTMCNC: 33 G/DL (ref 31–37)
MCV RBC AUTO: 90 FL
MONOCYTES # BLD AUTO: 0.41 X10(3) UL (ref 0.1–1)
MONOCYTES NFR BLD AUTO: 4.8 %
NEUTROPHILS # BLD AUTO: 5.01 X10 (3) UL (ref 1.5–7.7)
NEUTROPHILS # BLD AUTO: 5.01 X10(3) UL (ref 1.5–7.7)
NEUTROPHILS NFR BLD AUTO: 59.2 %
OSMOLALITY SERPL CALC.SUM OF ELEC: 287 MOSM/KG (ref 275–295)
P AXIS: 23 DEGREES
P-R INTERVAL: 128 MS
PLATELET # BLD AUTO: 325 10(3)UL (ref 150–450)
POTASSIUM SERPL-SCNC: 3.7 MMOL/L (ref 3.5–5.1)
Q-T INTERVAL: 364 MS
QRS DURATION: 82 MS
QTC CALCULATION (BEZET): 417 MS
R AXIS: 51 DEGREES
RBC # BLD AUTO: 4.41 X10(6)UL
SODIUM SERPL-SCNC: 136 MMOL/L (ref 136–145)
T AXIS: -5 DEGREES
TROPONIN I HIGH SENSITIVITY: 4 NG/L
TROPONIN I HIGH SENSITIVITY: 4 NG/L
VENTRICULAR RATE: 79 BPM
WBC # BLD AUTO: 8.5 X10(3) UL (ref 4–11)

## 2023-04-17 PROCEDURE — 71045 X-RAY EXAM CHEST 1 VIEW: CPT | Performed by: EMERGENCY MEDICINE

## 2023-04-17 PROCEDURE — 96375 TX/PRO/DX INJ NEW DRUG ADDON: CPT

## 2023-04-17 PROCEDURE — 85379 FIBRIN DEGRADATION QUANT: CPT | Performed by: EMERGENCY MEDICINE

## 2023-04-17 PROCEDURE — 96374 THER/PROPH/DIAG INJ IV PUSH: CPT

## 2023-04-17 PROCEDURE — 93005 ELECTROCARDIOGRAM TRACING: CPT

## 2023-04-17 PROCEDURE — 99285 EMERGENCY DEPT VISIT HI MDM: CPT

## 2023-04-17 PROCEDURE — 80048 BASIC METABOLIC PNL TOTAL CA: CPT | Performed by: EMERGENCY MEDICINE

## 2023-04-17 PROCEDURE — 93010 ELECTROCARDIOGRAM REPORT: CPT

## 2023-04-17 PROCEDURE — 85025 COMPLETE CBC W/AUTO DIFF WBC: CPT | Performed by: EMERGENCY MEDICINE

## 2023-04-17 PROCEDURE — 84484 ASSAY OF TROPONIN QUANT: CPT | Performed by: EMERGENCY MEDICINE

## 2023-04-17 RX ORDER — LABETALOL HYDROCHLORIDE 5 MG/ML
10 INJECTION, SOLUTION INTRAVENOUS ONCE
Status: DISCONTINUED | OUTPATIENT
Start: 2023-04-17 | End: 2023-04-17

## 2023-04-17 RX ORDER — LISINOPRIL 10 MG/1
10 TABLET ORAL DAILY
Qty: 30 TABLET | Refills: 0 | Status: SHIPPED | OUTPATIENT
Start: 2023-04-17 | End: 2023-05-17

## 2023-04-17 RX ORDER — LORAZEPAM 2 MG/ML
0.5 INJECTION INTRAMUSCULAR ONCE
Status: COMPLETED | OUTPATIENT
Start: 2023-04-17 | End: 2023-04-17

## 2023-04-17 RX ORDER — LABETALOL HYDROCHLORIDE 5 MG/ML
10 INJECTION, SOLUTION INTRAVENOUS ONCE
Status: COMPLETED | OUTPATIENT
Start: 2023-04-17 | End: 2023-04-17

## 2023-04-17 NOTE — TELEPHONE ENCOUNTER
Pt calling back and states she viewed  EKG 1/20/22 through my chart and it was normal.  Pt asking if she can have referral to cardiology for further eval or can Dr Sondra Bullard order an echo cardiogram.    Please advise

## 2023-04-17 NOTE — TELEPHONE ENCOUNTER
Relayed dr's note about no further testing needed. The pt is currently in the ER for chest pain and very elevated BP. EKG done at 1:11pm is normal. No T wave abnormality. Pt would like f/u appt only with Dr. Tunde Cornejo. Can she be added to schedule this week? Only TCM and Res 24 slots currently available.

## 2023-04-17 NOTE — ED INITIAL ASSESSMENT (HPI)
Patient ambulatory to ED with complaint of chest tightness and HTN. Abnormal ekg last Thursday. Patient is AXOX4.

## 2023-04-17 NOTE — TELEPHONE ENCOUNTER
No need for further test, T wave inversion were the same as an EKG form 2022. If she has further questions have come in to see me.

## 2023-04-18 ENCOUNTER — PATIENT OUTREACH (OUTPATIENT)
Dept: CASE MANAGEMENT | Age: 38
End: 2023-04-18

## 2023-04-18 ENCOUNTER — PATIENT MESSAGE (OUTPATIENT)
Dept: FAMILY MEDICINE CLINIC | Facility: CLINIC | Age: 38
End: 2023-04-18

## 2023-04-18 NOTE — TELEPHONE ENCOUNTER
From: Sade Kiser  To: Melissa Horner MD  Sent: 4/17/2023 1:27 PM CDT  Subject: ER visit    I was told to let your office know that I headed to the ER for tightness in my chest. Blood pressure is 168/128, waiting in the waiting room now. If Cardiology has a chance to review last Thursdays EKG, I'd greatly appreciate any insight they have asap.

## 2023-04-18 NOTE — TELEPHONE ENCOUNTER
From: Latrice Batres  To: Poncho Hull MD  Sent: 4/18/2023 6:55 AM CDT  Subject: Thyroid    Good morning Dr Anusha Dawkins,     After the ER yesterday, I'm be following up with Dr Diana Houston in cardiology in a week. My diastolic number seems to be what we can't get down. Dr Valentín Solomon prescribed lisinopril for the week while I wait and Dr Diana Houston said after the follow up he will schedule a coronary CT just to be sure given my family history. So it sounds like I don't need to follow up in your office at this time, but I wanted to ask if you're willing to order any additional thyroid tests. With all this unexplained weight gain I've experienced, and then reading about some correlation between some thyroid issues and higher diastolic numbers, I just want to over turn every rock to find the root cause as best we can. I feel like I'm still \"young\" and Im very active, so this blood pressure thing is really bothering me on top of the weight gain.      Please let me know your thoughts    Earma Harvey

## 2023-04-18 NOTE — TELEPHONE ENCOUNTER
There are no TCM or res 24 slot available this week. Would the doctor like to double book the patient? If so, which date and time?

## 2023-04-18 NOTE — TELEPHONE ENCOUNTER
To reception staff, pls call pt for appt this week per MD advised.        Future Appointments   Date Time Provider Demond Helen   4/24/2023  9:30 AM [facility]

## 2023-04-18 NOTE — PROGRESS NOTES
VM received; pt requesting assistance w/scheduling apt (dc 04/17)    Dr Elton Davidson, Cardiac Electrophysiology  [address] [place] [address] [id number] [phone]  Follow up 1 week

## 2023-04-18 NOTE — TELEPHONE ENCOUNTER
ER visit  (Newest Message First)  View All Conversations on this Encounter  Mary WE[facility] Em Triage Support (supporting Evette Cortes MD) 20 hours ago (1:27 PM)       I was told to let your office know that I headed to the ER for tightness in my chest. Blood pressure is 168/128, waiting in the waiting room now. If Cardiology has a chance to review last Thursdays EKG, I'd greatly appreciate any insight they have asap.

## 2023-04-20 ENCOUNTER — OFFICE VISIT (OUTPATIENT)
Dept: FAMILY MEDICINE CLINIC | Facility: CLINIC | Age: 38
End: 2023-04-20

## 2023-04-20 VITALS
WEIGHT: 155 LBS | DIASTOLIC BLOOD PRESSURE: 98 MMHG | HEART RATE: 79 BPM | TEMPERATURE: 98 F | BODY MASS INDEX: 27 KG/M2 | SYSTOLIC BLOOD PRESSURE: 142 MMHG

## 2023-04-20 DIAGNOSIS — I10 PRIMARY HYPERTENSION: Primary | ICD-10-CM

## 2023-04-20 PROCEDURE — 3080F DIAST BP >= 90 MM HG: CPT | Performed by: FAMILY MEDICINE

## 2023-04-20 PROCEDURE — 99213 OFFICE O/P EST LOW 20 MIN: CPT | Performed by: FAMILY MEDICINE

## 2023-04-20 PROCEDURE — 3077F SYST BP >= 140 MM HG: CPT | Performed by: FAMILY MEDICINE

## 2023-04-20 RX ORDER — LISINOPRIL 5 MG/1
5 TABLET ORAL DAILY
Qty: 30 TABLET | Refills: 1 | Status: SHIPPED | OUTPATIENT
Start: 2023-04-20

## 2023-04-20 RX ORDER — ETONOGESTREL AND ETHINYL ESTRADIOL 11.7; 2.7 MG/1; MG/1
INSERT, EXTENDED RELEASE VAGINAL
COMMUNITY
Start: 2023-04-16

## 2023-04-20 RX ORDER — BLOOD PRESSURE TEST KIT
KIT MISCELLANEOUS
Qty: 1 KIT | Refills: 0 | Status: SHIPPED | OUTPATIENT
Start: 2023-04-20

## 2023-05-03 ENCOUNTER — LAB ENCOUNTER (OUTPATIENT)
Dept: LAB | Facility: HOSPITAL | Age: 38
End: 2023-05-03
Attending: INTERNAL MEDICINE
Payer: COMMERCIAL

## 2023-05-03 DIAGNOSIS — I10 ESSENTIAL HYPERTENSION, MALIGNANT: ICD-10-CM

## 2023-05-03 DIAGNOSIS — R53.83 FATIGUE: Primary | ICD-10-CM

## 2023-05-03 LAB
ALBUMIN SERPL-MCNC: 3.5 G/DL (ref 3.4–5)
ALBUMIN/GLOB SERPL: 1 {RATIO} (ref 1–2)
ALP LIVER SERPL-CCNC: 28 U/L
ALT SERPL-CCNC: 23 U/L
ANION GAP SERPL CALC-SCNC: 5 MMOL/L (ref 0–18)
AST SERPL-CCNC: 13 U/L (ref 15–37)
BASOPHILS # BLD AUTO: 0.03 X10(3) UL (ref 0–0.2)
BASOPHILS NFR BLD AUTO: 0.5 %
BILIRUB SERPL-MCNC: 0.5 MG/DL (ref 0.1–2)
BUN BLD-MCNC: 13 MG/DL (ref 7–18)
BUN/CREAT SERPL: 18.6 (ref 10–20)
CALCIUM BLD-MCNC: 9 MG/DL (ref 8.5–10.1)
CHLORIDE SERPL-SCNC: 106 MMOL/L (ref 98–112)
CO2 SERPL-SCNC: 27 MMOL/L (ref 21–32)
CREAT BLD-MCNC: 0.7 MG/DL
DEPRECATED RDW RBC AUTO: 39.7 FL (ref 35.1–46.3)
EOSINOPHIL # BLD AUTO: 0.3 X10(3) UL (ref 0–0.7)
EOSINOPHIL NFR BLD AUTO: 5.2 %
ERYTHROCYTE [DISTWIDTH] IN BLOOD BY AUTOMATED COUNT: 11.8 % (ref 11–15)
FASTING STATUS PATIENT QL REPORTED: YES
GFR SERPLBLD BASED ON 1.73 SQ M-ARVRAT: 116 ML/MIN/1.73M2 (ref 60–?)
GLOBULIN PLAS-MCNC: 3.4 G/DL (ref 2.8–4.4)
GLUCOSE BLD-MCNC: 98 MG/DL (ref 70–99)
HCT VFR BLD AUTO: 40.1 %
HGB BLD-MCNC: 13.4 G/DL
IMM GRANULOCYTES # BLD AUTO: 0.01 X10(3) UL (ref 0–1)
IMM GRANULOCYTES NFR BLD: 0.2 %
LYMPHOCYTES # BLD AUTO: 2.33 X10(3) UL (ref 1–4)
LYMPHOCYTES NFR BLD AUTO: 40.5 %
MCH RBC QN AUTO: 30.5 PG (ref 26–34)
MCHC RBC AUTO-ENTMCNC: 33.4 G/DL (ref 31–37)
MCV RBC AUTO: 91.1 FL
MONOCYTES # BLD AUTO: 0.41 X10(3) UL (ref 0.1–1)
MONOCYTES NFR BLD AUTO: 7.1 %
NEUTROPHILS # BLD AUTO: 2.68 X10 (3) UL (ref 1.5–7.7)
NEUTROPHILS # BLD AUTO: 2.68 X10(3) UL (ref 1.5–7.7)
NEUTROPHILS NFR BLD AUTO: 46.5 %
OSMOLALITY SERPL CALC.SUM OF ELEC: 286 MOSM/KG (ref 275–295)
PLATELET # BLD AUTO: 252 10(3)UL (ref 150–450)
POTASSIUM SERPL-SCNC: 4.3 MMOL/L (ref 3.5–5.1)
PROT SERPL-MCNC: 6.9 G/DL (ref 6.4–8.2)
RBC # BLD AUTO: 4.4 X10(6)UL
SODIUM SERPL-SCNC: 138 MMOL/L (ref 136–145)
T4 FREE SERPL-MCNC: 0.9 NG/DL (ref 0.8–1.7)
TSI SER-ACNC: 1.28 MIU/ML (ref 0.36–3.74)
WBC # BLD AUTO: 5.8 X10(3) UL (ref 4–11)

## 2023-05-03 PROCEDURE — 84244 ASSAY OF RENIN: CPT

## 2023-05-03 PROCEDURE — 83835 ASSAY OF METANEPHRINES: CPT

## 2023-05-03 PROCEDURE — 84443 ASSAY THYROID STIM HORMONE: CPT

## 2023-05-03 PROCEDURE — 36415 COLL VENOUS BLD VENIPUNCTURE: CPT

## 2023-05-03 PROCEDURE — 82088 ASSAY OF ALDOSTERONE: CPT

## 2023-05-03 PROCEDURE — 84439 ASSAY OF FREE THYROXINE: CPT

## 2023-05-03 PROCEDURE — 80053 COMPREHEN METABOLIC PANEL: CPT

## 2023-05-03 PROCEDURE — 85025 COMPLETE CBC W/AUTO DIFF WBC: CPT

## 2023-05-07 LAB
METANEPHRINE: 24.1 PG/ML
NORMETANEPHRINE: 86.5 PG/ML

## 2023-05-11 ENCOUNTER — OFFICE VISIT (OUTPATIENT)
Dept: FAMILY MEDICINE CLINIC | Facility: CLINIC | Age: 38
End: 2023-05-11

## 2023-05-11 VITALS
BODY MASS INDEX: 26.67 KG/M2 | HEIGHT: 64 IN | WEIGHT: 156.19 LBS | HEART RATE: 77 BPM | DIASTOLIC BLOOD PRESSURE: 83 MMHG | SYSTOLIC BLOOD PRESSURE: 118 MMHG

## 2023-05-11 DIAGNOSIS — I10 PRIMARY HYPERTENSION: Primary | ICD-10-CM

## 2023-05-11 LAB
ALDOST/RENIN RATIO: 1.5
ALDOSTERONE: 6.4 NG/DL
RENIN ACTIVITY: 4.16 NG/ML/HR

## 2023-05-11 PROCEDURE — 3079F DIAST BP 80-89 MM HG: CPT | Performed by: FAMILY MEDICINE

## 2023-05-11 PROCEDURE — 3074F SYST BP LT 130 MM HG: CPT | Performed by: FAMILY MEDICINE

## 2023-05-11 PROCEDURE — 3008F BODY MASS INDEX DOCD: CPT | Performed by: FAMILY MEDICINE

## 2023-05-11 PROCEDURE — 99213 OFFICE O/P EST LOW 20 MIN: CPT | Performed by: FAMILY MEDICINE

## 2023-05-11 RX ORDER — LISINOPRIL 5 MG/1
5 TABLET ORAL DAILY
Qty: 90 TABLET | Refills: 3 | Status: SHIPPED | OUTPATIENT
Start: 2023-05-11

## 2023-05-16 ENCOUNTER — PATIENT MESSAGE (OUTPATIENT)
Dept: FAMILY MEDICINE CLINIC | Facility: CLINIC | Age: 38
End: 2023-05-16

## 2023-05-16 ENCOUNTER — TELEPHONE (OUTPATIENT)
Dept: FAMILY MEDICINE CLINIC | Facility: CLINIC | Age: 38
End: 2023-05-16

## 2023-05-16 NOTE — TELEPHONE ENCOUNTER
Regarding: FW: Tiredness  Contact: [phone]      ----- Message -----  From: Ibeth Quiroga CMA  Sent: 5/16/2023  11:24 AM CDT  To: Janet Rn Triage  Subject: Tiredness                                        ----- Message from Irma Dior [address] [place] [address] Gregorio sent at 5/16/2023 11:24 AM CDT -----       ----- Message from Silvestre Singh to Lyndsey De Leon MD sent at 5/16/2023  8:28 AM -----   The last couple weeks since starting the lisinopril I've been exhausted. I could fall asleep multiple times throughout the day, and that's really not like me - I've never been able nap! Is that concerning with this medication? I read a little about potassium levels spiking with it, I know my first blood test post starting it (I had been on it 4 days and cardiology ordered potassium test) I had not felt the tiredness yet, but the last 2 weeks I have been super slow moving.

## 2023-05-16 NOTE — TELEPHONE ENCOUNTER
Looks like also seeing Dr. Jerrod Guerrero - cardiologist. Can cut the pill in half and see if that helps and keep monitoring BP. Those readings are good.

## 2023-05-16 NOTE — TELEPHONE ENCOUNTER
From: Vikki Mcgovern  To: Rebekah Balderas MD  Sent: 5/16/2023 8:28 AM CDT  Subject: Tiredness    The last couple weeks since starting the lisinopril I've been exhausted. I could fall asleep multiple times throughout the day, and that's really not like me - I've never been able nap! Is that concerning with this medication? I read a little about potassium levels spiking with it, I know my first blood test post starting it (I had been on it 4 days and cardiology ordered potassium test) I had not felt the tiredness yet, but the last 2 weeks I have been super slow moving.

## 2023-05-22 ENCOUNTER — TELEPHONE (OUTPATIENT)
Dept: FAMILY MEDICINE CLINIC | Facility: CLINIC | Age: 38
End: 2023-05-22

## 2023-05-22 NOTE — TELEPHONE ENCOUNTER
Patient has been seeing cardiologist for a while, she got dx imaging performed and incidental findings were kidney stones. She was made aware she should follow-up with PCP. She asked if we received report - I made her aware I did not see it scanned in our system, she will have [facility] fax it again. She would like Dr. Armani Tom to review and advise, if any. I offered office visit and she declined at this time, she will await Dr. Miguelito Joiner recommendations. I made her aware I will convey the above. Patient verbalized understanding. No further questions or concerns at this time.     ON-SITE STAFF, please look out for fax from [facility] - advise, if any

## 2023-05-24 NOTE — TELEPHONE ENCOUNTER
Verified name and . Patient calling to follow up on renal ultrasound results. She asks if they have been received at office yet. Office staff, please assist and thank you.

## 2023-07-09 RX ORDER — LISINOPRIL 5 MG/1
5 TABLET ORAL DAILY
Qty: 30 TABLET | Refills: 0 | OUTPATIENT
Start: 2023-07-09

## 2023-08-01 ENCOUNTER — TELEPHONE (OUTPATIENT)
Dept: FAMILY MEDICINE CLINIC | Facility: CLINIC | Age: 38
End: 2023-08-01

## 2023-08-01 NOTE — TELEPHONE ENCOUNTER
Patient calling, confirmed name and . [facility] cardiologist ordered an ultrasound of kidneys in April for HTN with incidental finding of a kidney stone. Recently developed flank discomfort. Denies urinary symptoms. She is departing for vacation tomorrow and assisted to schedule when she returns:  Future Appointments   Date Time Provider Demond Helen   8/10/2023 11:45 AM Faiza Lew MD [facility]   2023  9:00 AM Vinod Granado MD [facility]     Advised on reasons to go to ER. Patient verbalized understanding and agrees.

## 2023-08-01 NOTE — TELEPHONE ENCOUNTER
No kidney ultrasound result under imaging or media. Called [facility] and spoke to [facility], confirmed patient's name and . She will fax the ultrasound report to [phone]. Please give a copy to Dr. Cha Pope for 8/10 appointment and send a copy for scanning. Future Appointments   Date Time Provider Demond Cervantes   8/10/2023 11:45 AM Juvencio Guy MD [facility]O   2023  9:00 AM Leida Guerrero MD [facility]     Per [facility] at [facility] [place], if the patient needs a CD she needs to call [facility] and ask for medical records.

## 2023-08-02 NOTE — TELEPHONE ENCOUNTER
[employer] message sent to patient. [facility] will fax the kidney ultrasound report to [phone]. MA's please give a copy to Dr. Charity Bernardo for 8/10 appointment and send a copy for scanning. Thank you. Inflammation Suggestive Of Cancer Camouflage Histology Text: There was a dense lymphocytic infiltrate which prevented adequate histologic evaluation of adjacent structures.

## 2023-09-08 RX ORDER — LISINOPRIL 5 MG/1
5 TABLET ORAL DAILY
Qty: 30 TABLET | Refills: 0 | OUTPATIENT
Start: 2023-09-08

## 2023-09-14 ENCOUNTER — TELEPHONE (OUTPATIENT)
Dept: FAMILY MEDICINE CLINIC | Facility: CLINIC | Age: 38
End: 2023-09-14

## 2023-09-14 RX ORDER — LISINOPRIL 5 MG/1
5 TABLET ORAL DAILY
Qty: 90 TABLET | Refills: 3 | Status: SHIPPED | OUTPATIENT
Start: 2023-09-14

## 2023-09-15 ENCOUNTER — OFFICE VISIT (OUTPATIENT)
Dept: SURGERY | Facility: CLINIC | Age: 38
End: 2023-09-15
Payer: COMMERCIAL

## 2023-09-15 VITALS
OXYGEN SATURATION: 96 % | DIASTOLIC BLOOD PRESSURE: 94 MMHG | WEIGHT: 162.81 LBS | HEIGHT: 64 IN | SYSTOLIC BLOOD PRESSURE: 134 MMHG | HEART RATE: 86 BPM | BODY MASS INDEX: 27.8 KG/M2

## 2023-09-15 DIAGNOSIS — R14.0 BLOATING: ICD-10-CM

## 2023-09-15 DIAGNOSIS — F43.9 STRESS: ICD-10-CM

## 2023-09-15 DIAGNOSIS — I10 HTN (HYPERTENSION), BENIGN: Primary | ICD-10-CM

## 2023-09-15 DIAGNOSIS — R63.2 BINGE EATING: ICD-10-CM

## 2023-09-15 DIAGNOSIS — E66.3 OVERWEIGHT (BMI 25.0-29.9): ICD-10-CM

## 2023-09-15 DIAGNOSIS — Z51.81 ENCOUNTER FOR THERAPEUTIC DRUG MONITORING: ICD-10-CM

## 2023-09-15 PROCEDURE — 3080F DIAST BP >= 90 MM HG: CPT | Performed by: INTERNAL MEDICINE

## 2023-09-15 PROCEDURE — 99214 OFFICE O/P EST MOD 30 MIN: CPT | Performed by: INTERNAL MEDICINE

## 2023-09-15 PROCEDURE — 3075F SYST BP GE 130 - 139MM HG: CPT | Performed by: INTERNAL MEDICINE

## 2023-09-15 PROCEDURE — 3008F BODY MASS INDEX DOCD: CPT | Performed by: INTERNAL MEDICINE

## 2023-09-15 RX ORDER — PHENTERMINE HYDROCHLORIDE 15 MG/1
15 CAPSULE ORAL EVERY MORNING
Qty: 30 CAPSULE | Refills: 2 | Status: SHIPPED | OUTPATIENT
Start: 2023-09-15

## 2023-09-18 ENCOUNTER — TELEPHONE (OUTPATIENT)
Dept: PHYSICAL MEDICINE AND REHAB | Facility: CLINIC | Age: 38
End: 2023-09-18

## 2023-09-18 ENCOUNTER — LAB ENCOUNTER (OUTPATIENT)
Dept: LAB | Facility: HOSPITAL | Age: 38
End: 2023-09-18
Attending: INTERNAL MEDICINE
Payer: COMMERCIAL

## 2023-09-18 DIAGNOSIS — I10 HTN (HYPERTENSION), BENIGN: ICD-10-CM

## 2023-09-18 DIAGNOSIS — E66.3 OVERWEIGHT (BMI 25.0-29.9): ICD-10-CM

## 2023-09-18 DIAGNOSIS — M54.16 LUMBAR RADICULOPATHY: Primary | ICD-10-CM

## 2023-09-18 DIAGNOSIS — M54.50 ACUTE LEFT-SIDED LOW BACK PAIN WITHOUT SCIATICA: ICD-10-CM

## 2023-09-18 DIAGNOSIS — Z51.81 ENCOUNTER FOR THERAPEUTIC DRUG MONITORING: ICD-10-CM

## 2023-09-18 DIAGNOSIS — R14.0 BLOATING: ICD-10-CM

## 2023-09-18 DIAGNOSIS — F43.9 STRESS: ICD-10-CM

## 2023-09-18 DIAGNOSIS — R63.2 BINGE EATING: ICD-10-CM

## 2023-09-18 LAB
CORTIS SERPL-MCNC: 10.7 UG/DL
CRP SERPL-MCNC: 0.36 MG/DL (ref ?–0.3)

## 2023-09-18 PROCEDURE — 86140 C-REACTIVE PROTEIN: CPT

## 2023-09-18 PROCEDURE — 82533 TOTAL CORTISOL: CPT

## 2023-09-18 PROCEDURE — 36415 COLL VENOUS BLD VENIPUNCTURE: CPT

## 2023-09-19 ENCOUNTER — HOSPITAL ENCOUNTER (OUTPATIENT)
Dept: GENERAL RADIOLOGY | Facility: HOSPITAL | Age: 38
Discharge: HOME | End: 2023-09-19
Attending: PHYSICAL MEDICINE & REHABILITATION
Payer: COMMERCIAL

## 2023-09-19 DIAGNOSIS — M54.50 ACUTE LEFT-SIDED LOW BACK PAIN WITHOUT SCIATICA: ICD-10-CM

## 2023-09-19 DIAGNOSIS — M54.16 LUMBAR RADICULOPATHY: ICD-10-CM

## 2023-09-19 PROCEDURE — 72110 X-RAY EXAM L-2 SPINE 4/>VWS: CPT | Performed by: PHYSICAL MEDICINE & REHABILITATION

## 2023-09-21 DIAGNOSIS — M54.50 ACUTE LEFT-SIDED LOW BACK PAIN WITHOUT SCIATICA: ICD-10-CM

## 2023-09-21 DIAGNOSIS — M54.16 LUMBAR RADICULOPATHY: Primary | ICD-10-CM

## 2023-09-28 ENCOUNTER — PATIENT MESSAGE (OUTPATIENT)
Dept: FAMILY MEDICINE CLINIC | Facility: CLINIC | Age: 38
End: 2023-09-28

## 2023-09-29 NOTE — TELEPHONE ENCOUNTER
From: Juliana Tinoco  To: Jose Miguel Casas  Sent: 9/28/2023 12:27 PM CDT  Subject: Thermogram    I recently had a cortisol test that showed elevated CRP, so that dr recommended reaching out to a rheumatologist. I'm hoping to have a full body thermogram done somewhere but am having trouble narrowing down locations that perform them that are in network with my insurance. Can you recommend a dr or location for thermography please?

## 2023-11-14 ENCOUNTER — OFFICE VISIT (OUTPATIENT)
Dept: FAMILY MEDICINE CLINIC | Facility: CLINIC | Age: 38
End: 2023-11-14

## 2023-11-14 VITALS
WEIGHT: 159 LBS | HEIGHT: 64 IN | SYSTOLIC BLOOD PRESSURE: 126 MMHG | DIASTOLIC BLOOD PRESSURE: 86 MMHG | HEART RATE: 82 BPM | BODY MASS INDEX: 27.14 KG/M2

## 2023-11-14 DIAGNOSIS — R03.0 BORDERLINE HIGH BLOOD PRESSURE: Primary | ICD-10-CM

## 2023-11-14 PROCEDURE — 3079F DIAST BP 80-89 MM HG: CPT | Performed by: FAMILY MEDICINE

## 2023-11-14 PROCEDURE — 99213 OFFICE O/P EST LOW 20 MIN: CPT | Performed by: FAMILY MEDICINE

## 2023-11-14 PROCEDURE — 3074F SYST BP LT 130 MM HG: CPT | Performed by: FAMILY MEDICINE

## 2023-11-14 PROCEDURE — 3008F BODY MASS INDEX DOCD: CPT | Performed by: FAMILY MEDICINE

## 2024-01-02 ENCOUNTER — TELEMEDICINE (OUTPATIENT)
Dept: FAMILY MEDICINE CLINIC | Facility: CLINIC | Age: 39
End: 2024-01-02
Payer: COMMERCIAL

## 2024-01-02 ENCOUNTER — PATIENT MESSAGE (OUTPATIENT)
Dept: FAMILY MEDICINE CLINIC | Facility: CLINIC | Age: 39
End: 2024-01-02

## 2024-01-02 ENCOUNTER — TELEPHONE (OUTPATIENT)
Dept: FAMILY MEDICINE CLINIC | Facility: CLINIC | Age: 39
End: 2024-01-02

## 2024-01-02 DIAGNOSIS — J02.0 STREP THROAT: Primary | ICD-10-CM

## 2024-01-02 PROCEDURE — 99213 OFFICE O/P EST LOW 20 MIN: CPT | Performed by: NURSE PRACTITIONER

## 2024-01-02 RX ORDER — AMOXICILLIN AND CLAVULANATE POTASSIUM 875; 125 MG/1; MG/1
1 TABLET, FILM COATED ORAL 2 TIMES DAILY
Qty: 14 TABLET | Refills: 0 | Status: SHIPPED | OUTPATIENT
Start: 2024-01-02 | End: 2024-01-09

## 2024-01-02 NOTE — PROGRESS NOTES
HPI    Virtual Telephone/Video Check-In    Milka Tracy verbally consents to a Virtual/Telephone Check-In visit on 24.  Patient has been referred to the [facility] website at [email or web address] to review the yearly Consent to Treat document.    Patient understands and accepts financial responsibility for any deductible, co-insurance and/or co-pays associated with this service.    Duration of the service: 6 minutes      Summary of topics discussed:     Patient presents for concerns of sore throat and swollen tonsils since last night.  Had kids tested for strep through [facility] last night and used a strep kit for herself and tested positive, as well.  Denies fever.      Review of Systems   HENT:  Positive for sore throat.         There were no vitals filed for this visit.  There is no height or weight on file to calculate BMI.    Health Maintenance   Topic Date Due    COVID-19 Vaccine (2023- season) 2023    Influenza Vaccine (1) 10/01/2023    Annual Depression Screening  2024    Annual Physical  2024    Pap Smear  2024    DTaP,Tdap,and Td Vaccines (4 - Td or Tdap) 2027    Pneumococcal Vaccine: Birth to 64yrs  Aged Out       Patient's last menstrual period was 2023 (exact date).    Past Medical History:   Diagnosis Date    Overweight (BMI 25.0-29.9)        .  Past Surgical History:   Procedure Laterality Date      2015, 5/15/2017    x2    Colonoscopy N/A 12/3/2019    Procedure: COLONOSCOPY;  Surgeon: Jorge Lopez MD;  Location: [facility] ENDO    D & c  2019    SAB; failed cytotec    [place] teeth removed      partial (top out bottom still in)       Family History   Problem Relation Age of Onset    Hypertension Father     Heart Attack Father 41    Heart Disorder Father     Other (Other) Father         PMR    Diabetes Mother     Other (Lung cancer) Maternal Grandmother         smoker    Other (Gall bladder cancer) Maternal Grandfather         Social History     Socioeconomic History    Marital status:      Spouse name: Not on file    Number of children: 2    Years of education: Not on file    Highest education level: Not on file   Occupational History    Occupation: stay at home mom   Tobacco Use    Smoking status: Never    Smokeless tobacco: Never   Vaping Use    Vaping Use: Never used   Substance and Sexual Activity    Alcohol use: Yes     Comment: occ    Drug use: No    Sexual activity: Yes     Partners: Male     Birth control/protection: Condom   Other Topics Concern    Not on file   Social History Narrative    Not on file     Social Determinants of Health     Financial Resource Strain: Not on file   Food Insecurity: Not on file   Transportation Needs: Not on file   Physical Activity: Not on file   Stress: Not on file   Social Connections: Not on file   Housing Stability: Not on file       Current Outpatient Medications   Medication Sig Dispense Refill    amoxicillin clavulanate 875-125 MG Oral Tab Take 1 tablet by mouth 2 (two) times daily for 7 days. 14 tablet 0       Allergies:  No Known Allergies    Physical Exam  Constitutional:       Appearance: Normal appearance.   Pulmonary:      Effort: No respiratory distress.   Neurological:      Mental Status: She is alert and oriented to person, place, and time.          Assessment and Plan:  Problem List Items Addressed This Visit    None  Visit Diagnoses       Strep throat    -  Primary    Relevant Medications    amoxicillin clavulanate 875-125 MG Oral Tab           Augmentin bid x 7 days.       Discussed plan of care with patient and patient is in agreement.  All questions answered. Patient to call with questions or concerns.    Encouraged to sign up for My Chart if not already registered.

## 2024-01-02 NOTE — TELEPHONE ENCOUNTER
Please see [employer] message-Requesting abx    Pt advised video appt -since both children has Strep and do not want to bring them out - video appt made with AICHA Flanagan          Good morning Dr Walton,      I wanted to ask before taking myself and the kids to urgent care, if you're able to prescribe for a positive strep test I got last night. I have a strep test kit at home, recommended by [facility] (which I use for my kids to avoid urgent cares as much as possible), and the kids are both positive for strep (already started meds). Last night I finally had the chance to test myself for my own sore and swollen throat and I'm positive as well. Before I bring them to an urgent with me just to test me again in an office, is it possible to get treatment by providing my results?      I'll attach pictures incase that's doable.         Milka Lawson   [id number]     [id number]

## 2024-01-02 NOTE — TELEPHONE ENCOUNTER
From: Milka Tracy  To: JESSICA FERRARO  Sent: 1/2/2024 7:39 AM CST  Subject: Strep prescription     Good morning Dr Ferraro,     I wanted to ask before taking myself and the kids to urgent care, if you're able to prescribe for a positive strep test I got last night. I have a strep test kit at home, recommended by [facility] (which I use for my kids to avoid urgent cares as much as possible), and the kids are both positive for strep (already started meds). Last night I finally had the chance to test myself for my own sore and swollen throat and I'm positive as well. Before I bring them to an urgent with me just to test me again in an office, is it possible to get treatment by providing my results?     I'll attach pictures incase that's doable.       Milka

## 2024-01-15 ENCOUNTER — TELEPHONE (OUTPATIENT)
Dept: FAMILY MEDICINE CLINIC | Facility: CLINIC | Age: 39
End: 2024-01-15

## 2024-01-15 ENCOUNTER — TELEMEDICINE (OUTPATIENT)
Dept: FAMILY MEDICINE CLINIC | Facility: CLINIC | Age: 39
End: 2024-01-15
Payer: COMMERCIAL

## 2024-01-15 DIAGNOSIS — J02.9 SORE THROAT: Primary | ICD-10-CM

## 2024-01-15 DIAGNOSIS — J02.0 STREP THROAT: ICD-10-CM

## 2024-01-15 RX ORDER — AZITHROMYCIN 250 MG/1
TABLET, FILM COATED ORAL
Qty: 6 TABLET | Refills: 0 | Status: SHIPPED | OUTPATIENT
Start: 2024-01-15 | End: 2024-01-20

## 2024-01-15 NOTE — TELEPHONE ENCOUNTER
Patient notified that her Z-mychal was sent to pharmacy, RN apologized for any inconvenience, patient was very appreciative.

## 2024-01-15 NOTE — TELEPHONE ENCOUNTER
Patient did finish ABT Augmentin last Thursday but this morning woke up with ST and white patches again in back of throat.     Patient has home strep test kits and showing positive last time. Still with symptoms and asking for zpak to get rid of this as that has helped in the past.    Scheduled for video visit today - advised may need throat swab in lab but could try video. She said kids are home today and she would prefer not to have to bring them with to a visit. Requested virtual.     Future Appointments   Date Time Provider Department Center   1/15/2024  9:00 AM Isac Fulton APRN [facility] AD[facility]   4/15/2024  9:00 AM [facility]

## 2024-01-15 NOTE — TELEPHONE ENCOUNTER
Patient called (identified name and ),   Had televisit this morning, was supposed to get Z-mychal for exposure to strep.  Nothing was sent to pharmacy.  Isac HOLCOMB, please advise?

## 2024-01-15 NOTE — PATIENT INSTRUCTIONS
Take your antibiotics as directed. Do not stop taking them just because you feel better complete the whole course.Strep throat can spread to others until 24 hours after you begin taking antibiotics. During this time, avoid contact with other people at work, school, or home, especially infants and children. Do not sneeze or cough on others, and wash your hands often. Keep your drinking glass and eating utensils separate from those of others. Wash these items well in hot, soapy water. Replace your toothbrush after being on antibiotics for 24 hours.  For supportive therapy, gargle with warm salt water once each hour as needed to help reduce swelling and make your throat feel better. Use 5 mL (1 tsp) of salt mixed in 250 mL (1 cup) of warm water.  Take an over-the-counter pain medication, such as acetaminophen (Tylenol), ibuprofen (Advil, Motrin), or naproxen (Aleve). Read and follow all instructions on the label.

## 2024-01-15 NOTE — ASSESSMENT & PLAN NOTE
Likely recurrent strep in setting of interrupted antibiotics.  Z-Socrates ordered  Patient instructed to take antibiotics as prescribed continue supportive measures if symptoms do not resolve she will need to be seen in office.  States understanding    Patient aware of plan of care. All questions answered to satisfaction of the patient. Patient instructed to call office or reach out via [employer] if any issues arise. For urgent issues and/or reviewed red flags please proceed to the urgent care or ER.  Also, inform the nurse practitioner with any new symptoms or medication side effects.

## 2024-01-15 NOTE — PROGRESS NOTES
This is a telemedicine visit with live, interactive video and audio.     Patient understands and accepts financial responsibility for any deductible, co-insurance and/or co-pays associated with this service.    Patient is a pleasant 38-year-old female past medical history significant for being overweight.  Patient presents via telehealth today for questionable strep.  Patient states no fevers, but having sore throat, and patches on her throat, and change in voice. Patient reports she was seen via telehealth on 24 for similar symptoms and tested at home for strep because she has kits from [employer]. She reports there were supply issues with augmentin and her doses were interupted during treatment. She also states this has happened in the past where strep has recurred for her on amoxicillin. Requesting z pack.           SUBJECTIVE  Review of Systems   Constitutional: Negative.  Negative for activity change and appetite change.   HENT:  Positive for sore throat and voice change. Negative for congestion, postnasal drip, rhinorrhea and tinnitus.    Eyes: Negative.    Respiratory:  Negative for apnea, cough, chest tightness and shortness of breath.    Cardiovascular:  Negative for chest pain and leg swelling.   Gastrointestinal: Negative.  Negative for abdominal pain, anal bleeding and blood in stool.   Genitourinary: Negative.  Negative for difficulty urinating, flank pain and menstrual problem.   Musculoskeletal: Negative.  Negative for joint swelling.   Skin: Negative.    Neurological: Negative.  Negative for dizziness and headaches.   Psychiatric/Behavioral: Negative.  Negative for agitation.         HISTORY:  Past Medical History:   Diagnosis Date    Overweight (BMI 25.0-29.9)       Past Surgical History:   Procedure Laterality Date      2015, 5/15/2017    x2    COLONOSCOPY N/A 12/3/2019    Procedure: COLONOSCOPY;  Surgeon: Jorge Lopez MD;  Location: [facility] ENDO    D & C  2019     SAB; failed cytotec    [place] TEETH REMOVED      partial (top out bottom still in)      Family History   Problem Relation Age of Onset    Hypertension Father     Heart Attack Father 41    Heart Disorder Father     Other (Other) Father         PMR    Diabetes Mother     Other (Lung cancer) Maternal Grandmother         smoker    Other (Gall bladder cancer) Maternal Grandfather       Social History     Socioeconomic History    Marital status:     Number of children: 2   Occupational History    Occupation: stay at home mom   Tobacco Use    Smoking status: Never    Smokeless tobacco: Never   Vaping Use    Vaping Use: Never used   Substance and Sexual Activity    Alcohol use: Yes     Comment: occ    Drug use: No    Sexual activity: Yes     Partners: Male     Birth control/protection: Condom        No Known Allergies   No current outpatient medications on file.       OBJECTIVE  Physical Exam:   alert, appears stated age, and cooperative, Speaking in full sentences comfortably, and Normal work of breathing    ASSESSMENT & PLAN  Problem List Items Addressed This Visit          Unprioritized    Sore throat - Primary     Supportive measures reviewed  No additional testing at this time  Follow-up in office if recurs.         Strep throat     Likely recurrent strep in setting of interrupted antibiotics.  Z-Socrates ordered  Patient instructed to take antibiotics as prescribed continue supportive measures if symptoms do not resolve she will need to be seen in office.  States understanding    Patient aware of plan of care. All questions answered to satisfaction of the patient. Patient instructed to call office or reach out via [email or web address] if any issues arise. For urgent issues and/or reviewed red flags please proceed to the urgent care or ER.  Also, inform the nurse practitioner with any new symptoms or medication side effects.                   AICHA Kurtz

## 2024-04-15 ENCOUNTER — OFFICE VISIT (OUTPATIENT)
Dept: FAMILY MEDICINE CLINIC | Facility: CLINIC | Age: 39
End: 2024-04-15

## 2024-04-15 ENCOUNTER — LAB ENCOUNTER (OUTPATIENT)
Dept: LAB | Age: 39
End: 2024-04-15
Attending: FAMILY MEDICINE
Payer: COMMERCIAL

## 2024-04-15 VITALS
DIASTOLIC BLOOD PRESSURE: 89 MMHG | WEIGHT: 155.19 LBS | HEIGHT: 64 IN | HEART RATE: 83 BPM | SYSTOLIC BLOOD PRESSURE: 120 MMHG | BODY MASS INDEX: 26.49 KG/M2

## 2024-04-15 DIAGNOSIS — Z00.00 PHYSICAL EXAM: ICD-10-CM

## 2024-04-15 DIAGNOSIS — Z00.00 PHYSICAL EXAM: Primary | ICD-10-CM

## 2024-04-15 DIAGNOSIS — N20.0 RENAL STONE: ICD-10-CM

## 2024-04-15 LAB
ALBUMIN SERPL-MCNC: 4.6 G/DL (ref 3.2–4.8)
ALBUMIN/GLOB SERPL: 1.7 {RATIO} (ref 1–2)
ALP LIVER SERPL-CCNC: 34 U/L
ALT SERPL-CCNC: 18 U/L
ANION GAP SERPL CALC-SCNC: 4 MMOL/L (ref 0–18)
AST SERPL-CCNC: 21 U/L (ref ?–34)
BASOPHILS # BLD AUTO: 0.04 X10(3) UL (ref 0–0.2)
BASOPHILS NFR BLD AUTO: 0.5 %
BILIRUB SERPL-MCNC: 0.6 MG/DL (ref 0.3–1.2)
BILIRUB UR QL: NEGATIVE
BUN BLD-MCNC: 10 MG/DL (ref 9–23)
BUN/CREAT SERPL: 11.8 (ref 10–20)
CALCIUM BLD-MCNC: 9.9 MG/DL (ref 8.7–10.4)
CHLORIDE SERPL-SCNC: 106 MMOL/L (ref 98–112)
CHOLEST SERPL-MCNC: 203 MG/DL (ref ?–200)
CLARITY UR: CLEAR
CO2 SERPL-SCNC: 28 MMOL/L (ref 21–32)
COLOR UR: COLORLESS
CREAT BLD-MCNC: 0.85 MG/DL
DEPRECATED RDW RBC AUTO: 40 FL (ref 35.1–46.3)
EGFRCR SERPLBLD CKD-EPI 2021: 91 ML/MIN/1.73M2 (ref 60–?)
EOSINOPHIL # BLD AUTO: 0.34 X10(3) UL (ref 0–0.7)
EOSINOPHIL NFR BLD AUTO: 4.2 %
ERYTHROCYTE [DISTWIDTH] IN BLOOD BY AUTOMATED COUNT: 11.9 % (ref 11–15)
EST. AVERAGE GLUCOSE BLD GHB EST-MCNC: 105 MG/DL (ref 68–126)
FASTING PATIENT LIPID ANSWER: YES
FASTING STATUS PATIENT QL REPORTED: YES
GLOBULIN PLAS-MCNC: 2.7 G/DL (ref 2.8–4.4)
GLUCOSE BLD-MCNC: 95 MG/DL (ref 70–99)
GLUCOSE UR-MCNC: NORMAL MG/DL
HBA1C MFR BLD: 5.3 % (ref ?–5.7)
HCT VFR BLD AUTO: 42.1 %
HDLC SERPL-MCNC: 64 MG/DL (ref 40–59)
HGB BLD-MCNC: 14.5 G/DL
HGB UR QL STRIP.AUTO: NEGATIVE
IMM GRANULOCYTES # BLD AUTO: 0.03 X10(3) UL (ref 0–1)
IMM GRANULOCYTES NFR BLD: 0.4 %
KETONES UR-MCNC: NEGATIVE MG/DL
LDLC SERPL CALC-MCNC: 122 MG/DL (ref ?–100)
LEUKOCYTE ESTERASE UR QL STRIP.AUTO: NEGATIVE
LYMPHOCYTES # BLD AUTO: 2.89 X10(3) UL (ref 1–4)
LYMPHOCYTES NFR BLD AUTO: 35.7 %
MCH RBC QN AUTO: 31.6 PG (ref 26–34)
MCHC RBC AUTO-ENTMCNC: 34.4 G/DL (ref 31–37)
MCV RBC AUTO: 91.7 FL
MONOCYTES # BLD AUTO: 0.52 X10(3) UL (ref 0.1–1)
MONOCYTES NFR BLD AUTO: 6.4 %
NEUTROPHILS # BLD AUTO: 4.28 X10 (3) UL (ref 1.5–7.7)
NEUTROPHILS # BLD AUTO: 4.28 X10(3) UL (ref 1.5–7.7)
NEUTROPHILS NFR BLD AUTO: 52.8 %
NITRITE UR QL STRIP.AUTO: NEGATIVE
NONHDLC SERPL-MCNC: 139 MG/DL (ref ?–130)
OSMOLALITY SERPL CALC.SUM OF ELEC: 285 MOSM/KG (ref 275–295)
PH UR: 7 [PH] (ref 5–8)
PLATELET # BLD AUTO: 331 10(3)UL (ref 150–450)
POTASSIUM SERPL-SCNC: 4.7 MMOL/L (ref 3.5–5.1)
PROT SERPL-MCNC: 7.3 G/DL (ref 5.7–8.2)
PROT UR-MCNC: NEGATIVE MG/DL
RBC # BLD AUTO: 4.59 X10(6)UL
SODIUM SERPL-SCNC: 138 MMOL/L (ref 136–145)
SP GR UR STRIP: 1.01 (ref 1–1.03)
TRIGL SERPL-MCNC: 98 MG/DL (ref 30–149)
TSI SER-ACNC: 1.05 MIU/ML (ref 0.55–4.78)
UROBILINOGEN UR STRIP-ACNC: NORMAL
VIT D+METAB SERPL-MCNC: 35.2 NG/ML (ref 30–100)
VLDLC SERPL CALC-MCNC: 17 MG/DL (ref 0–30)
WBC # BLD AUTO: 8.1 X10(3) UL (ref 4–11)

## 2024-04-15 PROCEDURE — 83036 HEMOGLOBIN GLYCOSYLATED A1C: CPT

## 2024-04-15 PROCEDURE — 84443 ASSAY THYROID STIM HORMONE: CPT

## 2024-04-15 PROCEDURE — 82306 VITAMIN D 25 HYDROXY: CPT

## 2024-04-15 PROCEDURE — 80053 COMPREHEN METABOLIC PANEL: CPT

## 2024-04-15 PROCEDURE — 81003 URINALYSIS AUTO W/O SCOPE: CPT

## 2024-04-15 PROCEDURE — 85025 COMPLETE CBC W/AUTO DIFF WBC: CPT

## 2024-04-15 PROCEDURE — 80061 LIPID PANEL: CPT

## 2024-04-15 PROCEDURE — 36415 COLL VENOUS BLD VENIPUNCTURE: CPT

## 2024-04-15 NOTE — PROGRESS NOTES
4/15/2024  9:02 AM    Milka Tracy is a 38 year old female.    Chief complaint(s):   Chief Complaint   Patient presents with    Routine Physical     HPI:     Milka Tracy primary complaint is regarding CPE.       Milka Tracy is a 38 year old female present today for a routine periodic health gynecological screening and/or complete physical examination.  Her last physical exam was 1 year(s) ago.  Patient's last menstrual period was 2024 (exact date). Menarche occurred at age 13 .  She is currently using  Nuva Ring as a form of contraception.    Milka Tracy is G 3, P2, Ab 1.   She has a history of veneral infection significant for none.   She performs breast self-exams monthly .  Her last TDAP (orTD) booster was 8 years ago.  She is current with her influenza immunization.  Her last Pap smear was 3 months ago and was normal .  Her last mammogram was last year due to a breast bump, normal.  Regarding colon cancer  screening test she underwent colonoscopy 2019 year(s) ago, findings were normal. None smoker.     In addition patient reports of having been diagnosed with renal lithiasis on the right kidney.  This was discovered last year by the cardiologist which recommends further testing.  At present time she has no back pain or abdominal pain.  Denies any dysuria or hematuria.      HISTORY:  Past Medical History:    Overweight (BMI 25.0-29.9)      Past Surgical History:   Procedure Laterality Date      2015, 5/15/2017    x2    Colonoscopy N/A 12/3/2019    Procedure: COLONOSCOPY;  Surgeon: Jorge Lopez MD;  Location: [facility] ENDO    D & c  2019    SAB; failed cytotec    [place] teeth removed      partial (top out bottom still in)      Family History   Problem Relation Age of Onset    Hypertension Father     Heart Attack Father 41    Heart Disorder Father     Other (Other) Father         PMR    Diabetes Mother     Other (Lung cancer) Maternal Grandmother         smoker    Other (Gall bladder  cancer) Maternal Grandfather       Social History:   Social History     Socioeconomic History    Marital status:     Number of children: 2   Occupational History    Occupation: stay at home mom   Tobacco Use    Smoking status: Never    Smokeless tobacco: Never   Vaping Use    Vaping status: Never Used   Substance and Sexual Activity    Alcohol use: Yes     Comment: occ    Drug use: No    Sexual activity: Yes     Partners: Male     Birth control/protection: Condom     Social Determinants of Health      Received from [facility], [facility]    Social Connections    Received from [facility], [facility]    Housing Stability        Immunizations:   Immunization History   Administered Date(s) Administered    >=3 YRS FLUZONE OR FLUARIX QUAD PRESERVE FREE SINGLE DOSE (46580) FLU CLINIC 09/01/2015    >=3 YRS QUAD MULTIDOSE VIAL (06014) FLU CLINIC 11/29/2016    Covid-19 Vaccine Pfizer 30 mcg/0.3 ml 04/08/2021, 04/29/2021, 01/04/2022    FLU VAC QIV SPLIT 3 YRS AND OLDER (23297) 11/20/2018, 09/26/2019, 09/01/2020    FLULAVAL 6 months & older 0.5 ml Prefilled syringe (19287) 01/20/2022    HPV (Gardasil) 05/09/2007, 11/20/2007    TDAP 03/20/2014, 09/28/2015, 05/16/2017       Medications (Active prior to today's visit):  No current outpatient medications on file.       Allergies:  No Known Allergies      ROS:   Review of Systems   Constitutional:  Negative for appetite change, fatigue and fever.   HENT:  Positive for sinus pressure. Negative for ear pain, hearing loss and nosebleeds.    Eyes:  Negative for visual disturbance.   Respiratory:  Negative for apnea, shortness of breath and wheezing.    Cardiovascular:  Negative for chest pain, palpitations and leg swelling.   Gastrointestinal:  Negative for abdominal pain, blood in stool, constipation, nausea and vomiting.   Endocrine: Negative for polydipsia and polyuria.   Genitourinary:  Negative  for dyspareunia, hematuria and menstrual problem.   Musculoskeletal:  Negative for arthralgias and back pain.   Skin:  Negative for rash.   Allergic/Immunologic: Negative for food allergies.   Neurological:  Negative for dizziness, syncope, light-headedness and headaches.   Psychiatric/Behavioral:  Negative for sleep disturbance.        PHYSICAL EXAM:   VS: /89   Pulse 83   Ht 5' 4\" (1.626 m)   Wt 155 lb 3.2 oz (70.4 kg)   LMP 04/11/2024 (Exact Date)   BMI 26.64 kg/m²     Physical Exam  Vitals reviewed. Exam conducted with a chaperone present.   Constitutional:       Appearance: She is well-developed.   HENT:      Head: Normocephalic.      Right Ear: Hearing, tympanic membrane, ear canal and external ear normal.      Left Ear: Hearing, tympanic membrane, ear canal and external ear normal.      Nose: Nose normal.      Comments: Normal sinus transillumination      Mouth/Throat:      Mouth: Mucous membranes are moist.   Eyes:      Extraocular Movements: Extraocular movements intact.      Conjunctiva/sclera: Conjunctivae normal.      Pupils: Pupils are equal, round, and reactive to light.   Neck:      Thyroid: No thyromegaly.   Cardiovascular:      Rate and Rhythm: Normal rate and regular rhythm.      Heart sounds: Normal heart sounds, S1 normal and S2 normal. No murmur heard.  Pulmonary:      Effort: Pulmonary effort is normal.      Breath sounds: Normal breath sounds.   Chest:   Breasts:     Right: No mass, nipple discharge or tenderness.      Left: No mass, nipple discharge or tenderness.   Abdominal:      General: Bowel sounds are normal.      Palpations: Abdomen is soft. There is no mass.      Tenderness: There is no abdominal tenderness.      Hernia: No hernia is present.   Musculoskeletal:      Cervical back: Neck supple.      Comments: Spine without scoliosis or kyphosis.  Range of motions of both upper and lower extremities are normal.   Lymphadenopathy:      Cervical: No cervical adenopathy.       Comments: LEs no edema    Skin:     Findings: No rash.   Neurological:      General: No focal deficit present.      Mental Status: She is alert.      Deep Tendon Reflexes:      Reflex Scores:       Patellar reflexes are 2+ on the right side and 2+ on the left side.  Psychiatric:         Mood and Affect: Mood and affect normal.         LABORATORY RESULTS:      EKG / Spirometry : -     Radiology: No results found.     ASSESSMENT/PLAN:   Assessment   Encounter Diagnoses   Name Primary?    Physical exam Yes    Renal stone        1. Physical exam    Assessment and Plan:     Milka Tracy Health checkup as follows:    LABORATORY & ORDERS:   Orders Placed This Encounter   Procedures    CBC With Differential With Platelet    Comp Metabolic Panel (14)    Hemoglobin A1C    Lipid Panel    TSH W Reflex To Free T4    Vitamin D    Urinalysis with Culture Reflex     REFERRALS: generated today : US KIDNEY/BLADDER (DEC=77921) .    IMMUNIZATIONS ordered and given today include: none.    RECOMMENDATIONS given include: ANTICIPATORY GUIDANCE  topics covered today include: safety (i.e. seat belts, helmets, sunscreen, protective sports gear ), nutrition (i.e. healthy meals and snacks (i.e. avoid junk food and high-carbohydrate foods); athletic conditioning, fluids; low fat milk, limit to less than 20 oz. a day; dental care with her dentist), and healthy habits & social competence & responsibilities: Recommendations on physical activity; exercise daily or at least 3 times a week for 30-60 minutes doing cardiovascular exercise. Patient educated on self breast examination to be done on a monthly basis.   REFUSALS:  Although recommended, the patient refuses the following: none .      FOLLOW-UP: Schedule a follow-up visit in 12 months.         2. Renal stone    Referral:  US KIDNEY/BLADDER (CPT=76770); Future   Lab: CMP       Orders This Visit:  Orders Placed This Encounter   Procedures    CBC With Differential With Platelet    Comp Metabolic  Panel (14)    Hemoglobin A1C    Lipid Panel    TSH W Reflex To Free T4    Vitamin D    Urinalysis with Culture Reflex       Meds This Visit:  Requested Prescriptions      No prescriptions requested or ordered in this encounter       Imaging & Referrals:  US KIDNEY/BLADDER (CPT=76770)         JESSICA FERRARO MD

## 2024-04-16 NOTE — PROGRESS NOTES
Please notify patient that his/her cholesterol levels were slight elevated. However, it only requires to follow a low cholesterol / low fat diet, exercise and recheck in 12 months.

## 2024-04-24 ENCOUNTER — HOSPITAL ENCOUNTER (OUTPATIENT)
Dept: ULTRASOUND IMAGING | Facility: HOSPITAL | Age: 39
Discharge: HOME | End: 2024-04-24
Attending: FAMILY MEDICINE
Payer: COMMERCIAL

## 2024-04-24 DIAGNOSIS — N20.0 RENAL STONE: ICD-10-CM

## 2024-04-24 PROCEDURE — 76770 US EXAM ABDO BACK WALL COMP: CPT | Performed by: FAMILY MEDICINE

## 2024-05-09 ENCOUNTER — LAB ENCOUNTER (OUTPATIENT)
Dept: LAB | Facility: HOSPITAL | Age: 39
End: 2024-05-09
Attending: NURSE PRACTITIONER
Payer: COMMERCIAL

## 2024-05-09 DIAGNOSIS — E78.00 PURE HYPERCHOLESTEROLEMIA: Primary | ICD-10-CM

## 2024-05-09 LAB
CHOLEST SERPL-MCNC: 188 MG/DL (ref ?–200)
FASTING PATIENT LIPID ANSWER: YES
HDLC SERPL-MCNC: 59 MG/DL (ref 40–59)
LDLC SERPL CALC-MCNC: 117 MG/DL (ref ?–100)
NONHDLC SERPL-MCNC: 129 MG/DL (ref ?–130)
TRIGL SERPL-MCNC: 63 MG/DL (ref 30–149)
VLDLC SERPL CALC-MCNC: 11 MG/DL (ref 0–30)

## 2024-05-09 PROCEDURE — 80061 LIPID PANEL: CPT

## 2024-05-09 PROCEDURE — 36415 COLL VENOUS BLD VENIPUNCTURE: CPT

## 2024-05-09 PROCEDURE — 83695 ASSAY OF LIPOPROTEIN(A): CPT

## 2024-05-10 LAB — LIPOPROTEIN (A): 199.8 NMOL/L

## 2024-05-16 ENCOUNTER — ORDER TRANSCRIPTION (OUTPATIENT)
Dept: ADMINISTRATIVE | Facility: HOSPITAL | Age: 39
End: 2024-05-16

## 2024-05-16 DIAGNOSIS — Z13.6 SCREENING FOR CARDIOVASCULAR CONDITION: Primary | ICD-10-CM

## 2024-07-09 ENCOUNTER — HOSPITAL ENCOUNTER (OUTPATIENT)
Dept: CT IMAGING | Facility: HOSPITAL | Age: 39
Discharge: HOME | End: 2024-07-09
Attending: FAMILY MEDICINE

## 2024-07-09 DIAGNOSIS — Z13.6 SCREENING FOR CARDIOVASCULAR CONDITION: ICD-10-CM

## 2024-07-09 LAB
POCT GLUCOSE CHOLESTECH: 101 (ref 70–99)
POCT HDL: 53 (ref 40–60)
POCT LDL: 83 (ref 0–99)
POCT TOTAL CHOLESTEROL: 170 (ref 110–200)
POCT TRIGLYCERIDES: 171 (ref 1–149)

## 2024-07-10 NOTE — PROGRESS NOTES
Date of Service 2024    JOCELINE WILEY  Date of Birth 1985    Patient Age: 38 year old    PCP: Michael Walton MD  [address] [place] [address] [address]  [facility] [address]    Heart Scan Consult  Preliminary Heart Scan Score: 0    Previous Screening  Heart Scan Completed Previously: No        Peripheral Vascular Scan Completed Previously: No          Risk Factors  Personal Risk Factors  Non-alterable Risk Factors: Family History (Reports, Father  of a heart attack at age 41)  Alterable Risk Factors: High Blood Pressure      Body Mass Index  There is no height or weight on file to calculate BMI.    Blood Pressure    (Normal =< 120/80,  Elevated = 120-129/ >80,  High Stage1 130-139/80-89 , Stage2 >140/>90)    Lipid Profile  Patient was in fasting state: No    Cholesterol: 170, done on 2024.  HDL Cholesterol: 53, done on 2024.  LDL Cholesterol: 83, done on 2024.  TriGlycerides 171, done on 2024.    Cholesterol Goals  Value   Total  =< 200   HDL  = > 45 Men = > 55 Women   LDL   =< 100   Triglycerides  =< 150       Glucose and Hemoglobin A1C    2024  Glucose - 101 (non fasting    Lab Results   Component Value Date    GLU 95 04/15/2024    A1C 5.3 04/15/2024     (Normal Fasting Glucose < 100mg/dl )    Nurse Review  Risk factor information and results reviewed with Nurse: Yes    Recommended Follow Up:  Consult your physician regarding:: Final Heart Scan Report;Discuss potential for Incidental Finding;Discuss Potential for Score Variance      Recommendations for Change:  Nutrition Changes: Low Saturated Fat;Low Fat Dairy;Low Salt Eating;Increase Fiber    Cholesterol Modification (goal of therapy depends upon your risk): Increase HDL (Healthy/Good) Normal >45 Men >55 Women    Exercise:  (Reports exercising regularly)    Smoking Cessation: No Change Needed (Not a smoker)         Stress Management: Adopt Stress Management Techniques    Repeat Heart Scan: 5 years if Calcium Score is 0.0;Discuss with your  Physician              [place] Recommended Resources:  Recommended Resources: Upcoming Classes, Medical Services and Health Library [email or web address]     Other Resources:: Handouts given - Controlling Risk Factors, Cholesterol, and Hypertension      Manjeet SINGH RN        Please Contact the Nurse Heart Line with any Questions or Concerns [phone].   never intubated

## 2025-04-16 ENCOUNTER — OFFICE VISIT (OUTPATIENT)
Dept: FAMILY MEDICINE CLINIC | Facility: CLINIC | Age: 40
End: 2025-04-16
Payer: COMMERCIAL

## 2025-04-16 ENCOUNTER — LAB ENCOUNTER (OUTPATIENT)
Dept: LAB | Age: 40
End: 2025-04-16
Attending: FAMILY MEDICINE
Payer: COMMERCIAL

## 2025-04-16 VITALS
HEART RATE: 83 BPM | BODY MASS INDEX: 26.25 KG/M2 | DIASTOLIC BLOOD PRESSURE: 94 MMHG | HEIGHT: 63.5 IN | WEIGHT: 150 LBS | SYSTOLIC BLOOD PRESSURE: 133 MMHG

## 2025-04-16 DIAGNOSIS — Z00.00 PHYSICAL EXAM: ICD-10-CM

## 2025-04-16 DIAGNOSIS — Z00.00 PHYSICAL EXAM: Primary | ICD-10-CM

## 2025-04-16 LAB
ALBUMIN SERPL-MCNC: 4.8 G/DL (ref 3.2–4.8)
ALBUMIN/GLOB SERPL: 2.2 {RATIO} (ref 1–2)
ALP LIVER SERPL-CCNC: 32 U/L (ref 37–98)
ALT SERPL-CCNC: 13 U/L (ref 10–49)
ANION GAP SERPL CALC-SCNC: 6 MMOL/L (ref 0–18)
AST SERPL-CCNC: 16 U/L (ref ?–34)
BASOPHILS # BLD AUTO: 0.03 X10(3) UL (ref 0–0.2)
BASOPHILS NFR BLD AUTO: 0.5 %
BILIRUB SERPL-MCNC: 0.7 MG/DL (ref 0.3–1.2)
BILIRUB UR QL: NEGATIVE
BUN BLD-MCNC: 12 MG/DL (ref 9–23)
BUN/CREAT SERPL: 15 (ref 10–20)
CALCIUM BLD-MCNC: 9.5 MG/DL (ref 8.7–10.4)
CHLORIDE SERPL-SCNC: 106 MMOL/L (ref 98–112)
CHOLEST SERPL-MCNC: 230 MG/DL (ref ?–200)
CLARITY UR: CLEAR
CO2 SERPL-SCNC: 27 MMOL/L (ref 21–32)
CREAT BLD-MCNC: 0.8 MG/DL (ref 0.55–1.02)
DEPRECATED RDW RBC AUTO: 38.1 FL (ref 35.1–46.3)
EGFRCR SERPLBLD CKD-EPI 2021: 97 ML/MIN/1.73M2 (ref 60–?)
EOSINOPHIL # BLD AUTO: 0.19 X10(3) UL (ref 0–0.7)
EOSINOPHIL NFR BLD AUTO: 3.1 %
ERYTHROCYTE [DISTWIDTH] IN BLOOD BY AUTOMATED COUNT: 11.3 % (ref 11–15)
EST. AVERAGE GLUCOSE BLD GHB EST-MCNC: 105 MG/DL (ref 68–126)
FASTING PATIENT LIPID ANSWER: YES
FASTING STATUS PATIENT QL REPORTED: YES
GLOBULIN PLAS-MCNC: 2.2 G/DL (ref 2–3.5)
GLUCOSE BLD-MCNC: 96 MG/DL (ref 70–99)
GLUCOSE UR-MCNC: NORMAL MG/DL
HBA1C MFR BLD: 5.3 % (ref ?–5.7)
HCT VFR BLD AUTO: 41.7 % (ref 35–48)
HDLC SERPL-MCNC: 68 MG/DL (ref 40–59)
HGB BLD-MCNC: 14.2 G/DL (ref 12–16)
IMM GRANULOCYTES # BLD AUTO: 0.01 X10(3) UL (ref 0–1)
IMM GRANULOCYTES NFR BLD: 0.2 %
KETONES UR-MCNC: NEGATIVE MG/DL
LDLC SERPL CALC-MCNC: 150 MG/DL (ref ?–100)
LEUKOCYTE ESTERASE UR QL STRIP.AUTO: NEGATIVE
LYMPHOCYTES # BLD AUTO: 2.74 X10(3) UL (ref 1–4)
LYMPHOCYTES NFR BLD AUTO: 44.8 %
MCH RBC QN AUTO: 30.9 PG (ref 26–34)
MCHC RBC AUTO-ENTMCNC: 34.1 G/DL (ref 31–37)
MCV RBC AUTO: 90.8 FL (ref 80–100)
MONOCYTES # BLD AUTO: 0.42 X10(3) UL (ref 0.1–1)
MONOCYTES NFR BLD AUTO: 6.9 %
NEUTROPHILS # BLD AUTO: 2.72 X10 (3) UL (ref 1.5–7.7)
NEUTROPHILS # BLD AUTO: 2.72 X10(3) UL (ref 1.5–7.7)
NEUTROPHILS NFR BLD AUTO: 44.5 %
NITRITE UR QL STRIP.AUTO: NEGATIVE
NONHDLC SERPL-MCNC: 162 MG/DL (ref ?–130)
OSMOLALITY SERPL CALC.SUM OF ELEC: 288 MOSM/KG (ref 275–295)
PH UR: 6.5 [PH] (ref 5–8)
PLATELET # BLD AUTO: 284 10(3)UL (ref 150–450)
POTASSIUM SERPL-SCNC: 4.8 MMOL/L (ref 3.5–5.1)
PROT SERPL-MCNC: 7 G/DL (ref 5.7–8.2)
PROT UR-MCNC: NEGATIVE MG/DL
RBC # BLD AUTO: 4.59 X10(6)UL (ref 3.8–5.3)
SODIUM SERPL-SCNC: 139 MMOL/L (ref 136–145)
SP GR UR STRIP: 1.02 (ref 1–1.03)
TRIGL SERPL-MCNC: 70 MG/DL (ref 30–149)
TSI SER-ACNC: 1.27 UIU/ML (ref 0.55–4.78)
UROBILINOGEN UR STRIP-ACNC: NORMAL
VIT D+METAB SERPL-MCNC: 34.9 NG/ML (ref 30–100)
VLDLC SERPL CALC-MCNC: 13 MG/DL (ref 0–30)
WBC # BLD AUTO: 6.1 X10(3) UL (ref 4–11)

## 2025-04-16 PROCEDURE — 80053 COMPREHEN METABOLIC PANEL: CPT

## 2025-04-16 PROCEDURE — 85025 COMPLETE CBC W/AUTO DIFF WBC: CPT

## 2025-04-16 PROCEDURE — 84443 ASSAY THYROID STIM HORMONE: CPT

## 2025-04-16 PROCEDURE — 83036 HEMOGLOBIN GLYCOSYLATED A1C: CPT

## 2025-04-16 PROCEDURE — 82306 VITAMIN D 25 HYDROXY: CPT

## 2025-04-16 PROCEDURE — 81001 URINALYSIS AUTO W/SCOPE: CPT

## 2025-04-16 PROCEDURE — 80061 LIPID PANEL: CPT

## 2025-04-16 PROCEDURE — 36415 COLL VENOUS BLD VENIPUNCTURE: CPT

## 2025-04-16 PROCEDURE — 99395 PREV VISIT EST AGE 18-39: CPT | Performed by: FAMILY MEDICINE

## 2025-04-16 RX ORDER — ETONOGESTREL AND ETHINYL ESTRADIOL VAGINAL RING .015; .12 MG/D; MG/D
1 RING VAGINAL
COMMUNITY

## 2025-04-16 RX ORDER — AZITHROMYCIN 250 MG/1
TABLET, FILM COATED ORAL
COMMUNITY
Start: 2024-12-04 | End: 2025-04-16 | Stop reason: ALTCHOICE

## 2025-04-16 NOTE — PROGRESS NOTES
4/16/2025  8:48 AM    Milka Tracy is a 39 year old female.    Chief complaint(s):   Chief Complaint   Patient presents with    Routine Physical     HPI:     Milka Tracy primary complaint is regarding CPE.       Milka Tracy is a 39 year old female present today for a routine periodic health gynecological screening and/or complete physical examination.  Her last physical exam was 1 year(s) ago. Patient's last menstrual period was 04/14/2025.   Menarche occurred at age 13 .  She is currently using  Nuva Ring as a form of contraception.    Milka Tracy is G 3, P2, Ab 1.   She has a history of veneral infection significant for none.   She performs breast self-exams monthly .  Her last TDAP (orTD) booster was 8 years ago.  She is current with her influenza immunization.  Her last Pap smear was 3 months ago and was normal .  Her last mammogram was last year due to a breast bump, normal.  Regarding colon cancer  screening test she underwent colonoscopy 2019 year(s) ago, findings were normal. None smoker.       HISTORY:  Past Medical History[1]   Past Surgical History[2]   Family History[3]   Social History: Short Social Hx on File[4]     Immunizations:   Immunization History   Administered Date(s) Administered    >=3 YRS FLUZONE OR FLUARIX QUAD PRESERVE FREE SINGLE DOSE (56921) FLU CLINIC 09/01/2015    >=3 YRS QUAD MULTIDOSE VIAL (70613) FLU CLINIC 11/29/2016    Covid-19 Vaccine Pfizer 30 mcg/0.3 ml 04/08/2021, 04/29/2021, 01/04/2022    FLU VAC QIV SPLIT 3 YRS AND OLDER (37219) 11/20/2018, 09/26/2019, 09/01/2020    FLULAVAL 6 months & older 0.5 ml Prefilled syringe (72629) 01/20/2022    HPV (Gardasil) 05/09/2007, 11/20/2007    TDAP 03/20/2014, 09/28/2015, 05/16/2017       Medications (Active prior to today's visit):  Current Medications[5]    Allergies:  Allergies[6]      ROS:   Review of Systems   Constitutional:  Negative for appetite change, diaphoresis, fatigue and fever.   HENT:  Negative for hearing loss and  nosebleeds.    Eyes:  Negative for visual disturbance.   Respiratory:  Negative for shortness of breath.    Cardiovascular:  Negative for chest pain and palpitations.   Gastrointestinal:  Negative for abdominal pain, diarrhea, nausea and vomiting.   Endocrine: Negative for polydipsia and polyuria.   Genitourinary:  Negative for hematuria and menstrual problem.   Musculoskeletal:  Negative for arthralgias.   Skin:  Negative for rash.   Neurological:  Negative for dizziness and headaches.   Psychiatric/Behavioral:  Negative for dysphoric mood and sleep disturbance.        PHYSICAL EXAM:   VS: BP (!) 133/94 (BP Location: Right arm, Patient Position: Sitting, Cuff Size: adult)   Pulse 83   Ht 5' 3.5\" (1.613 m)   Wt 150 lb (68 kg)   LMP 04/14/2025   BMI 26.15 kg/m²     Physical Exam  Vitals reviewed. Exam conducted with a chaperone present.   Constitutional:       Appearance: She is well-developed.   HENT:      Head: Normocephalic.      Right Ear: Hearing, tympanic membrane, ear canal and external ear normal.      Left Ear: Hearing, tympanic membrane, ear canal and external ear normal.      Nose: Nose normal.      Mouth/Throat:      Mouth: Mucous membranes are moist.   Eyes:      Extraocular Movements: Extraocular movements intact.      Conjunctiva/sclera: Conjunctivae normal.      Pupils: Pupils are equal, round, and reactive to light.   Neck:      Thyroid: No thyromegaly.   Cardiovascular:      Rate and Rhythm: Normal rate and regular rhythm.      Heart sounds: Normal heart sounds, S1 normal and S2 normal. No murmur heard.  Pulmonary:      Effort: Pulmonary effort is normal.      Breath sounds: Normal breath sounds.   Chest:   Breasts:     Right: No mass.      Left: No mass.   Abdominal:      General: Bowel sounds are normal.      Palpations: Abdomen is soft. There is no mass.      Tenderness: There is no abdominal tenderness.      Hernia: No hernia is present.   Musculoskeletal:      Cervical back: Neck supple.       Comments: Spine without scoliosis or kyphosis.  Range of motions of both upper and lower extremities are normal.   Lymphadenopathy:      Cervical: No cervical adenopathy.      Comments: LEs no edema    Skin:     Findings: No rash.   Neurological:      General: No focal deficit present.      Mental Status: She is alert.      Deep Tendon Reflexes:      Reflex Scores:       Patellar reflexes are 2+ on the right side and 2+ on the left side.  Psychiatric:         Mood and Affect: Mood and affect normal.         LABORATORY RESULTS:     EKG / Spirometry : -     Radiology: No results found.     ASSESSMENT/PLAN:   Assessment   Encounter Diagnosis   Name Primary?    Physical exam Yes       Assessment and Plan:     Milka Tracy Health checkup as follows:    LABORATORY & ORDERS:   Orders Placed This Encounter   Procedures    CBC With Differential With Platelet    Comp Metabolic Panel (14)    Hemoglobin A1C    Lipid Panel    TSH W Reflex To Free T4    Vitamin D    Urinalysis with Culture Reflex     IMMUNIZATIONS ordered and given today include: none.    RECOMMENDATIONS given include: ANTICIPATORY GUIDANCE  topics covered today include: safety (i.e. seat belts, helmets, sunscreen, protective sports gear ), nutrition (i.e. healthy meals and snacks (i.e. avoid junk food and high-carbohydrate foods); athletic conditioning, fluids; low fat milk, limit to less than 20 oz. a day; dental care with her dentist), and healthy habits & social competence & responsibilities: Recommendations on physical activity; exercise daily or at least 3 times a week for 30-60 minutes doing cardiovascular exercise. Patient educated on self breast examination to be done on a monthly basis.  Consider a  if over weight and/or having difficult in staying active. Attempt to keep a schedule that includes adequate sleep, and physical activities/exercise. Patient was educated on sexual transmitted disease. Best to abstain from sexual  intercourse until she is ready to form a family. Use of condoms may prevent transmission of infections as well as pregnancy.   Contraception option chosen by patient was NuvaRing.  REFUSALS:  Although recommended, the patient refuses the following: none .      FOLLOW-UP: Schedule a follow-up visit in 12 months.            Orders This Visit:  Orders Placed This Encounter   Procedures    CBC With Differential With Platelet    Comp Metabolic Panel (14)    Hemoglobin A1C    Lipid Panel    TSH W Reflex To Free T4    Vitamin D    Urinalysis with Culture Reflex       Meds This Visit:  Requested Prescriptions      No prescriptions requested or ordered in this encounter       Imaging & Referrals:  None         JESSICA FERRARO MD         [1]   Past Medical History:   Overweight (BMI 25.0-29.9)   [2]   Past Surgical History:  Procedure Laterality Date      2015, 5/15/2017    x2    Colonoscopy N/A 12/3/2019    Procedure: COLONOSCOPY;  Surgeon: Jorge Lopez MD;  Location: [facility] ENDO    D & c  2019    SAB; failed cytotec    [place] teeth removed      partial (top out bottom still in)   [3]   Family History  Problem Relation Age of Onset    Hypertension Father     Heart Attack Father 41    Heart Disorder Father     Other (Other) Father         PMR    Diabetes Mother     Other (Lung cancer) Maternal Grandmother         smoker    Other (Gall bladder cancer) Maternal Grandfather    [4]   Social History  Socioeconomic History    Marital status:     Number of children: 2   Occupational History    Occupation: stay at home mom   Tobacco Use    Smoking status: Never    Smokeless tobacco: Never   Vaping Use    Vaping status: Never Used   Substance and Sexual Activity    Alcohol use: Yes     Comment: occ    Drug use: No    Sexual activity: Yes     Partners: Male     Birth control/protection: Condom     Social Drivers of Health      Received from [facility]    Housing Stability   [5]    Current Outpatient Medications   Medication Sig Dispense Refill    Etonogestrel-Ethinyl Estradiol (NUVARING) 0.12-0.015 MG/24HR Vaginal Ring Place 1 Ring vaginally.     [6] No Known Allergies

## 2025-04-17 ENCOUNTER — TELEPHONE (OUTPATIENT)
Dept: FAMILY MEDICINE CLINIC | Facility: CLINIC | Age: 40
End: 2025-04-17

## 2025-04-17 NOTE — TELEPHONE ENCOUNTER
Phone call made, spoke with the patient.  Reassured patient is to follow a low cholesterol low-fat diet, exercise and recheck levels again in 1 year.  No need for medication at present time.

## 2025-05-08 ENCOUNTER — MED REC SCAN ONLY (OUTPATIENT)
Dept: FAMILY MEDICINE CLINIC | Facility: CLINIC | Age: 40
End: 2025-05-08

## 2025-08-20 ENCOUNTER — OFFICE VISIT (OUTPATIENT)
Dept: SURGERY | Facility: CLINIC | Age: 40
End: 2025-08-20

## 2025-08-20 VITALS
OXYGEN SATURATION: 99 % | HEART RATE: 66 BPM | BODY MASS INDEX: 25.95 KG/M2 | DIASTOLIC BLOOD PRESSURE: 82 MMHG | SYSTOLIC BLOOD PRESSURE: 120 MMHG | RESPIRATION RATE: 16 BRPM | WEIGHT: 152 LBS | HEIGHT: 64 IN

## 2025-08-20 DIAGNOSIS — I10 HTN (HYPERTENSION), BENIGN: Primary | ICD-10-CM

## 2025-08-20 DIAGNOSIS — R63.2 BINGE EATING: ICD-10-CM

## 2025-08-20 DIAGNOSIS — E78.5 DYSLIPIDEMIA: ICD-10-CM

## 2025-08-20 DIAGNOSIS — E66.3 OVERWEIGHT (BMI 25.0-29.9): ICD-10-CM

## 2025-08-20 DIAGNOSIS — Z51.81 ENCOUNTER FOR THERAPEUTIC DRUG MONITORING: ICD-10-CM

## (undated) DEVICE — FORCEP RADIAL JAW 4

## (undated) NOTE — LETTER
[facility]  Administration of Anesthesia  1. Diona Boas, or _________________________________ acting on her behalf, (Patient) (Dependent/Representative) request to receive anesthesia for my pending procedure/operation/treatment.   A deseany infections, high spinal block, spinal bleeding, seizure, cardiac arrest and death. 7. AWARENESS: I understand that it is possible (but unlikely) to have explicit memory of events from the operating room while under general anesthesia.   8. ELECTROCONVULSIV unconscious pt /Relationship    My signature below affirms that prior to the time of the procedure, I have explained to the patient and/or his/her guardian, the risks and benefits of undergoing anesthesia, as well as any reasonable alternatives.     _______

## (undated) NOTE — LETTER
[address], [place]  Authorization for Invasive Procedures  1.  I hereby authorize Dr. Norlene Fleischer** , my physician and whomever may be designated as the doctor's assistant, to perform the following operation and/or procedure:  col performed for the purposes of advancing medicine, science, and/or education, provided my identity is not revealed. If the procedure has been videotaped, the physician/surgeon will obtain the original videotape.  The hospital will not be responsible for stor My signature below affirms that prior to the time of the procedure, I have explained to the patient and/or her legal representative, the risks and benefits involved in the proposed treatment and any reasonable alternative to the proposed treatment.  I have

## (undated) NOTE — LETTER
9/4/2019              [address]        [facility] [id number]         Dear Renzo Buenrostro,    This letter is to inform you that our office has made several attempts to reach you by phone without success.   We were attempting to contact you b

## (undated) NOTE — ED AVS SNAPSHOT
Qiana Mendosa   MRN: [id number]    Department:  [facility] Emergency Department   Date of Visit:  1/17/2019           Disclosure     Insurance plans vary and the physician(s) referred by the ER may not be covered by your plan.  Please contact your i CARE PHYSICIAN AT ONCE OR RETURN IMMEDIATELY TO THE EMERGENCY DEPARTMENT. If you have been prescribed any medication(s), please fill your prescription right away and begin taking the medication(s) as directed.   If you believe that any of the medications

## (undated) NOTE — LETTER
[facility], [address] [place] [address] Street, [facility]  [address]  [facility] [id number]  [phone]                12/04/19      [address] [place] [id number]    Dear Kai Quiñones,    I reviewed the pathology report from the poly